# Patient Record
Sex: FEMALE | Race: WHITE | NOT HISPANIC OR LATINO | Employment: UNEMPLOYED | URBAN - METROPOLITAN AREA
[De-identification: names, ages, dates, MRNs, and addresses within clinical notes are randomized per-mention and may not be internally consistent; named-entity substitution may affect disease eponyms.]

---

## 2017-05-23 ENCOUNTER — ALLSCRIPTS OFFICE VISIT (OUTPATIENT)
Dept: OTHER | Facility: OTHER | Age: 27
End: 2017-05-23

## 2017-10-23 ENCOUNTER — ALLSCRIPTS OFFICE VISIT (OUTPATIENT)
Dept: OTHER | Facility: OTHER | Age: 27
End: 2017-10-23

## 2017-10-23 ENCOUNTER — GENERIC CONVERSION - ENCOUNTER (OUTPATIENT)
Dept: OTHER | Facility: OTHER | Age: 27
End: 2017-10-23

## 2017-10-24 LAB
ABO GROUP BLD: NORMAL
BLD GP AB SCN SERPL QL: NEGATIVE
HCG, QUANTITATIVE (HISTORICAL): NORMAL MIU/ML
PROGESTERONE LEVEL (HISTORICAL): 13.5 NG/ML
RH BLD: POSITIVE

## 2017-10-25 ENCOUNTER — ALLSCRIPTS OFFICE VISIT (OUTPATIENT)
Dept: OTHER | Facility: OTHER | Age: 27
End: 2017-10-25

## 2017-10-26 DIAGNOSIS — O36.80X0 PREGNANCY WITH INCONCLUSIVE FETAL VIABILITY: ICD-10-CM

## 2017-10-26 LAB
HCG, QUANTITATIVE (HISTORICAL): NORMAL MIU/ML
PROGESTERONE LEVEL (HISTORICAL): 13.2 NG/ML

## 2017-10-27 ENCOUNTER — GENERIC CONVERSION - ENCOUNTER (OUTPATIENT)
Dept: OTHER | Facility: OTHER | Age: 27
End: 2017-10-27

## 2017-10-31 ENCOUNTER — HOSPITAL ENCOUNTER (OUTPATIENT)
Dept: ULTRASOUND IMAGING | Facility: MEDICAL CENTER | Age: 27
Discharge: HOME | End: 2017-10-31
Payer: COMMERCIAL

## 2017-10-31 ENCOUNTER — GENERIC CONVERSION - ENCOUNTER (OUTPATIENT)
Dept: OTHER | Facility: OTHER | Age: 27
End: 2017-10-31

## 2017-10-31 DIAGNOSIS — O36.80X0 PREGNANCY WITH INCONCLUSIVE FETAL VIABILITY: ICD-10-CM

## 2017-10-31 PROCEDURE — 76801 OB US < 14 WKS SINGLE FETUS: CPT

## 2017-11-06 ENCOUNTER — ALLSCRIPTS OFFICE VISIT (OUTPATIENT)
Dept: OTHER | Facility: OTHER | Age: 27
End: 2017-11-06

## 2017-11-07 LAB
BACTERIA UR QL AUTO: ABNORMAL
BILIRUB UR QL STRIP: NEGATIVE
COLOR UR: YELLOW
COMMENT (HISTORICAL): CLEAR
CRYSTAL TYPE (HISTORICAL): ABNORMAL
CRYSTALS URNS QL MICRO: PRESENT
FECAL OCCULT BLOOD DIAGNOSTIC (HISTORICAL): NEGATIVE
GLUCOSE (HISTORICAL): NEGATIVE
KETONES UR STRIP-MCNC: NEGATIVE MG/DL
LEUKOCYTE ESTERASE UR QL STRIP: ABNORMAL
MICROSCOPIC EXAMINATION (HISTORICAL): ABNORMAL
NITRITE UR QL STRIP: NEGATIVE
NON-SQ EPI CELLS URNS QL MICRO: ABNORMAL /HPF
PH UR STRIP.AUTO: 6 [PH] (ref 5–7.5)
PROT UR STRIP-MCNC: NEGATIVE MG/DL
RBC (HISTORICAL): ABNORMAL /HPF
SP GR UR STRIP.AUTO: 1 (ref 1–1.03)
UROBILINOGEN UR QL STRIP.AUTO: 0.2 EU/DL (ref 0.2–1)
WBC # BLD AUTO: ABNORMAL /HPF

## 2017-11-08 LAB
ABO GROUP BLD: ABNORMAL
BASOPHILS # BLD AUTO: 0 %
BASOPHILS # BLD AUTO: 0 X10E3/UL (ref 0–0.2)
BLD GP AB SCN SERPL QL: NEGATIVE
DEPRECATED RDW RBC AUTO: 12.6 % (ref 12.3–15.4)
EOSINOPHIL # BLD AUTO: 0.1 X10E3/UL (ref 0–0.4)
EOSINOPHIL # BLD AUTO: 1 %
HCT VFR BLD AUTO: 39.5 % (ref 34–46.6)
HEPATITIS B SURFACE ANTIGEN (HISTORICAL): NEGATIVE
HGB BLD-MCNC: 13.2 G/DL (ref 11.1–15.9)
HIV-1/2 AB-AG (HISTORICAL): NON REACTIVE
IMM.GRANULOCYTES (CD4/8) (HISTORICAL): 0 %
IMM.GRANULOCYTES (CD4/8) (HISTORICAL): 0 X10E3/UL (ref 0–0.1)
LYMPHOCYTES # BLD AUTO: 2.9 X10E3/UL (ref 0.7–3.1)
LYMPHOCYTES # BLD AUTO: 33 %
MCH RBC QN AUTO: 30.6 PG (ref 26.6–33)
MCHC RBC AUTO-ENTMCNC: 33.4 G/DL (ref 31.5–35.7)
MCV RBC AUTO: 91 FL (ref 79–97)
MONOCYTES # BLD AUTO: 0.9 X10E3/UL (ref 0.1–0.9)
MONOCYTES (HISTORICAL): 10 %
NEUTROPHILS # BLD AUTO: 5 X10E3/UL (ref 1.4–7)
NEUTROPHILS # BLD AUTO: 56 %
PLATELET # BLD AUTO: 105 X10E3/UL (ref 150–379)
RBC (HISTORICAL): 4.32 X10E6/UL (ref 3.77–5.28)
RH BLD: POSITIVE
RPR SCREEN (HISTORICAL): NON REACTIVE
RUBELLA, IGG (HISTORICAL): <0.9 INDEX
WBC # BLD AUTO: 8.8 X10E3/UL (ref 3.4–10.8)

## 2017-11-09 LAB
CULTURE RESULT (HISTORICAL): NORMAL
MISCELLANEOUS LAB TEST RESULT (HISTORICAL): NORMAL

## 2017-11-13 ENCOUNTER — GENERIC CONVERSION - ENCOUNTER (OUTPATIENT)
Dept: OTHER | Facility: OTHER | Age: 27
End: 2017-11-13

## 2017-11-24 ENCOUNTER — GENERIC CONVERSION - ENCOUNTER (OUTPATIENT)
Dept: PERINATAL CARE | Facility: MEDICAL CENTER | Age: 27
End: 2017-11-24

## 2017-12-05 ENCOUNTER — GENERIC CONVERSION - ENCOUNTER (OUTPATIENT)
Dept: OTHER | Facility: OTHER | Age: 27
End: 2017-12-05

## 2017-12-05 ENCOUNTER — APPOINTMENT (OUTPATIENT)
Dept: PERINATAL CARE | Facility: CLINIC | Age: 27
End: 2017-12-05
Payer: COMMERCIAL

## 2017-12-05 PROCEDURE — 76813 OB US NUCHAL MEAS 1 GEST: CPT | Performed by: OBSTETRICS & GYNECOLOGY

## 2017-12-14 ENCOUNTER — GENERIC CONVERSION - ENCOUNTER (OUTPATIENT)
Dept: OTHER | Facility: OTHER | Age: 27
End: 2017-12-14

## 2017-12-16 LAB
CHLAMYDIA TRACHOMATIS BY MOL. METHOD (HISTORICAL): NEGATIVE
N GONORRHOEAE, AMPLIFIED DNA (HISTORICAL): NEGATIVE

## 2018-01-11 ENCOUNTER — GENERIC CONVERSION - ENCOUNTER (OUTPATIENT)
Dept: OTHER | Facility: OTHER | Age: 28
End: 2018-01-11

## 2018-01-11 NOTE — RESULT NOTES
Verified Results  US OB < 14 WEEKS SINGLE OR FIRST DESTINATION LEVEL 1 31Oct2017 03:40PM Audra Iqbal Order Number: [id number]    - Patient Instructions: To schedule this appointment, please contact Central Scheduling at [phone]  Test Name Result Flag Reference   US OB < 14 WEEKS SINGLE OR FIRST GESTATION (Report)     FIRST TRIMESTER OBSTETRIC ULTRASOUND, COMPLETE     INDICATION: Pregnancy with inconclusive fetal viability  LMP produces estimated gestational age of 11 weeks, 0 days  COMPARISON: None  TECHNIQUE:  Transabdominal ultrasound of the pelvis was performed  Additional transvaginal imaging was then performed to better assess the gestation, myometrial/endometrial architecture and ovarian parenchymal detail  The study includes volumetric    sweeps and traditional still imaging technique  FINDINGS:     A single live intrauterine gestation is identified  Cardiac activity is present  Heart rate of 164 bpm      YOLK SAC: Present and normal in size and appearance  MEAN GESTATIONAL SAC SIZE: 26 mm = 7 weeks 2 days    MEAN CROWN RUMP LENGTH: 17 mm = 8 weeks 0 days    FETAL ANATOMY: Grossly appropriate for gestational age but not well evaluated due to small size  AMNIOTIC FLUID/SAC SHAPE: Within expected normal range  Normal cortical amniotic separation  PLACENTA: Not well demonstrated  Mildly hypoechoic appearance of the endometrium adjacent to the gestational sac is neither crescentic, nor does it have mass effect on the gestational sac  This is not felt to represent perigestational hemorrhage  UTERUS/ADNEXA:    The uterus and ovaries are within normal limits  The cervix remains closed  No free fluid present  IMPRESSION:     Single live intrauterine gestation at 8 weeks 0 days (range +/- 5 days)  NIRAV of 6/12/2018 is identical to clinically estimated date         Workstation performed: [id number]     Signed by:   Mark Miguel MD   10/31/17

## 2018-01-12 NOTE — RESULT NOTES
Verified Results  (1) HCG QUANT [id number] 12:00AM [employer] Sabal     Test Name Result Flag Reference   hCG,Beta Subunit,Qnt,Serum 66780 mIU/mL     Female (Non-pregnant)    0 -     5                                             (Postmenopausal)  0 -     8                                      Female (Pregnant)                                      Weeks of Gestation                                              3                6 -    71                                              4               10 -   305                                              3              427 -  3363                                              6              [address] - [facility]                                              7             [address]167650                                              3            [address] [facility]                                              0            [phone]                                             16            [address] [place] [address]                                             [phone]                                             38             2860 - [address]47                                             27             6397 - 34468  Specimen was diluted in  order to obtain results  Results were repeated    Roche ECLIA methodology     (1) PROGESTERONE 23Oct2017 12:00AM [employer] Sabal     Test Name Result Flag Reference   Progesterone 75 8 ng/mL     Follicular phase       0 1 -   0 9                                      Luteal phase           1 8 -  23 9                                      Ovulation phase        0 1 -  12 0                                      Pregnant                                         First trimester    11 0 -  44 3 Second trimester   25 4 -  83 3                                         Third trimester    58 7 - 214 0                                      Postmenopausal         0 0 -   0 1     (LC) Rh+ABO+Ab Scr 23Oct2017 12:00AM Carr Benja     Test Name Result Flag Reference   ABO Grouping O     Rh Factor Positive     Please note: Prior records for this patient's ABO / Rh type are not  available for additional verification     Antibody Screen Negative  Negative

## 2018-01-12 NOTE — PROGRESS NOTES
Chief Complaint  Pt presents for a HCG/ Prog/ and T&S blood draw  LMP 9/9/2017      Active Problems    1  Encounter for gynecological examination with Papanicolaou smear of cervix (V72 31)   (Z01 419)   2  Encounter for gynecological examination without abnormal finding (V72 31) (Z01 419)   3  Encounter for preconception consultation (V26 49) (Z31 69)    Current Meds   1  Multi Prenatal TABS; Therapy: (Recorded:14Oct2016) to Recorded    Allergies    1  No Known Drug Allergies    Plan  Encounter to determine fetal viability of pregnancy    · (1) HCG QUANT; Status:Active; Requested IWD:[id number];    · (1) PROGESTERONE; Status:Active; Requested QKQ:[id number];    · (1) TYPE & SCREEN; Status:Active; Requested AAB:[id number];   currently receiving a biologic? : No  the patient pregnant or have they been in the last 90 days? : Yes  the patient been transfused in the last 3 months? : No  or PreOp : Medical    Future Appointments    Date/Time Provider Specialty Site   10/25/2017 08:00 AM Herb Nurse Schedule  OB [address] [place] [address]     Signatures   Electronically signed by :  CELENA Lucas ; Oct 23 2017 12:12PM EST                       (Author)

## 2018-01-14 VITALS
DIASTOLIC BLOOD PRESSURE: 78 MMHG | SYSTOLIC BLOOD PRESSURE: 126 MMHG | WEIGHT: 142.06 LBS | HEIGHT: 64 IN | BODY MASS INDEX: 24.25 KG/M2

## 2018-01-15 NOTE — PROGRESS NOTES
Chief Complaint  Pt presents today for a repeat hcg  Active Problems    1  Encounter for gynecological examination with Papanicolaou smear of cervix (V72 31)   (Z01 419)   2  Encounter for gynecological examination without abnormal finding (V72 31) (Z01 419)   3  Encounter for preconception consultation (V26 49) (Z31 69)   4  Encounter to determine fetal viability of pregnancy (V23 87) (O36 80X0)   5  Positive urine pregnancy test (V72 42) (Z32 01)    Current Meds   1  Multi Prenatal TABS; Therapy: (Recorded:14Oct2016) to Recorded    Allergies    1   No Known Drug Allergies    Results/Data  (1) HCG QUANT 25Oct2017 12:00AM Chely Sutton     Test Name Result Flag Reference   hCG,Beta Subunit,Qnt,Serum 29024 mIU/mL     Female (Non-pregnant)    0 -     5                                             (Postmenopausal)  0 -     8                                      Female (Pregnant)                                      Weeks of Gestation                                              3                6 -    71                                              4               10 -   750                                              5              262 - 8624                                              6              [address] [address]             [phone]                                              8            [address] [place] [address]                                              9            [phone]                                             [address] [place] [address]                                             [address] [id number] [phone]  Specimen was diluted in  order to obtain results  Results were repeated  Roche ECLIA methodology     (1) PROGESTERONE 25Oct2017 12:00AM Mary Sutton     Test Name Result Flag Reference   Progesterone 35 6 ng/mL     Follicular phase       0 1 -   0 9                                      Luteal phase           1 8 -  23 9                                      Ovulation phase        0 1 -  12 0                                      Pregnant                                         First trimester    11 0 -  44 3                                         Second trimester   25 4 -  83 3                                         Third trimester    58 7 - 214 0                                      Postmenopausal         0 0 -   0 1       Plan  Encounter to determine fetal viability of pregnancy    · (1) PROGESTERONE; Status:Complete;   Done: 63DZK3931 12:00AM  Positive urine pregnancy test    · (1) HCG QUANT; Status:Complete;   Done: 68MLW0024 12:00AM    Signatures   Electronically signed by :  CELENA Snyder ; Oct 27 2017 10:50AM EST                       (Author)

## 2018-01-17 NOTE — RESULT NOTES
Verified Results  (1) HCG QUANT [id number] 12:00AM Chely Sutton     Test Name Result Flag Reference   hCG,Beta Subunit,Qnt,Serum 56464 mIU/mL     Female (Non-pregnant)    0 -     5                                             (Postmenopausal)  0 -     8                                      Female (Pregnant)                                      Weeks of Gestation                                              3                6 -    71                                              4               10 -   750                                              5              130 - 2620                                              6              [address]                                              8            [address] [facility]                                              8            [address] [id number]                                             01            [facility] [facility] [address] -861248                                             70            [address] [facility]                                             9529 6837 [phone]                                             88             [phone] - [phone]  Specimen was diluted in  order to obtain results  Results were repeated    Roche ECLIA methodology       Plan  Encounter to determine fetal viability of pregnancy    · (1) PROGESTERONE; Status:Resulted - Requires Verification;   Done: 16UQR1257  12:00AM  Positive urine pregnancy test    · (1) HCG QUANT; Status:Complete;   Done: 52MJX2364 12:00AM

## 2018-01-23 NOTE — PROGRESS NOTES
DEC 5 2017         RE: Zach Emanuel                                 To: [facility]   MR#: [id number]                                  [address] [facility] [address]   : [address]: [phone]                             Fax: [phone]   (Exam #: [id number])      The LMP of this 32year old,  G1, P0-0-0-0 patient was SEP 5 2017, giving   her an NIRAV of 2018 and a current gestational age of 17 weeks 0 days   by dates  A sonographic examination was performed on DEC 5 2017 using real   time equipment  The ultrasound examination was performed using abdominal   technique  The patient has a BMI of 25 5  Her blood pressure today was   115/77  Earliest US on record:  10/31/2017  8w0d   2018 NIRAV      Thank you very much for referring this very nice patient for genetic   screening  This is her 1st pregnancy  She has no significant medical   history  Her surgical history is significant for wisdom teeth extraction  She denies the current use of tobacco, alcohol, or drugs  She currently   takes prenatal vitamins and has no significant drug allergies  Her family   medical history is unremarkable  A review of systems is otherwise   negative  On exam, the patient appears well, in no acute distress, and her   abdomen is nontender  Multiple longitudinal and transverse sections revealed a corral   intrauterine pregnancy with the fetus in transverse presentation  The   placenta is anterior in implantation        Cardiac motion was observed at 157 bpm       INDICATIONS      first trimester genetic screening      Exam Types      Level I   sequential screen      RESULTS      Fetus # 1 of 1   Transverse presentation   Fetal growth appeared normal      MEASUREMENTS (* Included In Average GA)      CRL              6 2 cm        12 weeks 3 days*   Nuchal Trans    1 70 mm      THE AVERAGE GESTATIONAL AGE is 12 weeks 3 days +/- 7 days  ANATOMY COMMENTS      Anatomic detail is limited at this gestational age  The fetal cranium   appeared normal in shape and the nuchal translucency was normal in size   (1 7mm)  The nasal bone appears to be present  The intracranial anatomy   was unremarkable  The cardiac rhythm was regular and documented with   M-mode  Within the abdomen, the stomach & bladder were visualized and the   abdominal wall appeared intact  A three vessel cord appears to be present  Active movement of the fetal body & extremities was seen  There is no   suspicion of a subchorionic bleed  The placental cord insertion appeared   normal    Evaluation of the spine and thorax were limited due to fetal   position  There is no suspicion of a uterine myoma  Free fluid is not seen   in the posterior cul-de-sac  ADNEXA      The left ovary appeared normal and measured 2 1 x 2 3 x 1 4 cm with a   volume of 3 5 cc  The right ovary appeared normal and measured 1 8 x 1 4 x   2 1 cm with a volume of 2 8 cc  AMNIOTIC FLUID         Largest Vertical Pocket = 4 0 cm   Amniotic Fluid: Normal      IMPRESSION      Reyna IUP   12 weeks and 3 days by this ultrasound  (NIRAV=JUN 16 2018)   Transverse presentation   Fetal growth appeared normal   Regular fetal heart rate of 157 bpm   Anterior placenta      GENERAL COMMENT      We discussed the options for genetic screening, including but not limited   to first trimester screening, second trimester screening, combined first   and second trimester screening, noninvasive prenatal testing (NIPT) for   patients at high risk and diagnostic screening through the use of CVS and   amniocentesis    We discussed the risks and benefits of each approach   including the sensitivities and false positive rates as well as the   difference between a screening test and a diagnostic test   At the   conclusion of our discussion the patient elected Sequential Screening to delineate her risk for fetal aneuploidy  A maternal blood sample was   obtained on the day of the ultrasound  The first trimester portion of the   screening results, encompassing age, nuchal translucency, and biochemistry   should be available within one week of testing and will be reported from   [facility]   The second stage of sequential screening should be completed   between the 15th and 21st week of pregnancy (ideally between 16-18 weeks)  We will call the patient with any and all results and scan the results   under Genetic counseling  We discussed follow-up in detail and I recommend an anatomy ultrasound be   scheduled for 20 weeks gestation  Thank you very much for allowing us to participate in the care of this   very nice patient  Should you have any questions, please do not hesitate   to contact our office  Please note, in addition to the time spent discussing the results of the   ultrasound, I spent approximately 10 minutes of face-to-face time with the   patient, greater than 50% of which was spent in counseling and the   coordination of care for this patient  Portions of the record may have been created with voice recognition   software  Occasional wrong word or "sound a like" substitutions may have   occurred due to the inherent limitations of voice recognition software  Read the chart carefully and recognize, using context, where substitutions   have occurred  Lubertha Costa, R D M S Florencia Nyhan, M D     Electronically signed 12/05/17 10:42

## 2018-01-24 VITALS
HEIGHT: 64 IN | WEIGHT: 144 LBS | DIASTOLIC BLOOD PRESSURE: 77 MMHG | BODY MASS INDEX: 24.59 KG/M2 | SYSTOLIC BLOOD PRESSURE: 115 MMHG

## 2018-01-24 VITALS
WEIGHT: 145.38 LBS | HEIGHT: 64 IN | SYSTOLIC BLOOD PRESSURE: 110 MMHG | BODY MASS INDEX: 24.82 KG/M2 | DIASTOLIC BLOOD PRESSURE: 74 MMHG

## 2018-01-24 VITALS — BODY MASS INDEX: 26.33 KG/M2 | DIASTOLIC BLOOD PRESSURE: 82 MMHG | WEIGHT: 151 LBS | SYSTOLIC BLOOD PRESSURE: 122 MMHG

## 2018-01-31 ENCOUNTER — ROUTINE PRENATAL (OUTPATIENT)
Dept: PERINATAL CARE | Facility: CLINIC | Age: 28
End: 2018-01-31
Payer: COMMERCIAL

## 2018-01-31 VITALS
WEIGHT: 153.2 LBS | SYSTOLIC BLOOD PRESSURE: 130 MMHG | BODY MASS INDEX: 27.14 KG/M2 | HEART RATE: 76 BPM | DIASTOLIC BLOOD PRESSURE: 79 MMHG | HEIGHT: 63 IN

## 2018-01-31 DIAGNOSIS — Z36.86 ENCOUNTER FOR ANTENATAL SCREENING FOR CERVICAL LENGTH: Primary | ICD-10-CM

## 2018-01-31 DIAGNOSIS — Z36.3 ENCOUNTER FOR ANTENATAL SCREENING FOR MALFORMATIONS: ICD-10-CM

## 2018-01-31 PROCEDURE — 76817 TRANSVAGINAL US OBSTETRIC: CPT | Performed by: OBSTETRICS & GYNECOLOGY

## 2018-01-31 PROCEDURE — 76805 OB US >/= 14 WKS SNGL FETUS: CPT | Performed by: OBSTETRICS & GYNECOLOGY

## 2018-01-31 PROCEDURE — 99212 OFFICE O/P EST SF 10 MIN: CPT | Performed by: OBSTETRICS & GYNECOLOGY

## 2018-01-31 NOTE — PROGRESS NOTES
Ellyn Connors presents to the [facility] today for level 2 ultrasound evaluation  Please refer to the ultrasound report for additional information

## 2018-02-06 ENCOUNTER — OB ABSTRACT (OUTPATIENT)
Dept: OBGYN CLINIC | Facility: MEDICAL CENTER | Age: 28
End: 2018-02-06

## 2018-02-07 ENCOUNTER — ROUTINE PRENATAL (OUTPATIENT)
Dept: OBGYN CLINIC | Facility: MEDICAL CENTER | Age: 28
End: 2018-02-07

## 2018-02-07 VITALS — SYSTOLIC BLOOD PRESSURE: 126 MMHG | BODY MASS INDEX: 27.74 KG/M2 | WEIGHT: 156.6 LBS | DIASTOLIC BLOOD PRESSURE: 74 MMHG

## 2018-02-07 DIAGNOSIS — Z34.92 SECOND TRIMESTER PREGNANCY: Primary | ICD-10-CM

## 2018-02-07 PROCEDURE — PNV: Performed by: OBSTETRICS & GYNECOLOGY

## 2018-02-07 NOTE — PROGRESS NOTES
Min Alfaro is a 25y o  year old  at 22w1d for routine prenatal visit    + FM, no vaginal bleeding, contractions, or LOF  Complaints: NO  Most recent ultrasound and labs reviewed  - normal anatomy , having a girl  Already had flu shot  Follow up in 4 weeks   Aware will need 1hr gtt at 28 weeks

## 2018-03-07 NOTE — PROGRESS NOTES
Education  Baby & Me Education 1st Trimester:   First Trimester Education provided:   benefits of breastfeeding, importance of exclusive breastfeeding, early initiation of breastfeeding, exclusive breastfeeding for the first 6 months and Pregnancy Essentials Reference Guide given   The patient is planning on breastfeeding  Prenatal education provided by: Kathe man      Signatures   Electronically signed by :  PATRICE Mina; Nov 6 2017  5:31PM EST                       (Author)

## 2018-03-08 ENCOUNTER — ROUTINE PRENATAL (OUTPATIENT)
Dept: OBGYN CLINIC | Facility: MEDICAL CENTER | Age: 28
End: 2018-03-08

## 2018-03-08 VITALS — WEIGHT: 162 LBS | BODY MASS INDEX: 28.7 KG/M2 | DIASTOLIC BLOOD PRESSURE: 70 MMHG | SYSTOLIC BLOOD PRESSURE: 110 MMHG

## 2018-03-08 DIAGNOSIS — Z34.02 ENCOUNTER FOR SUPERVISION OF NORMAL FIRST PREGNANCY IN SECOND TRIMESTER: Primary | ICD-10-CM

## 2018-03-08 PROBLEM — Z28.39 RUBELLA NON-IMMUNE STATUS, ANTEPARTUM: Status: ACTIVE | Noted: 2018-03-08

## 2018-03-08 PROBLEM — O09.899 RUBELLA NON-IMMUNE STATUS, ANTEPARTUM: Status: ACTIVE | Noted: 2018-03-08

## 2018-03-08 PROCEDURE — PNV: Performed by: OBSTETRICS & GYNECOLOGY

## 2018-03-08 NOTE — PROGRESS NOTES
Rula Damon is a 25y o  year old  at 34w1d for routine prenatal visit    + FM, no vaginal bleeding, contractions, or LOF  Complaints: No   Most recent ultrasound and labs reviewed    28 week labs and tdap at next visit  Discussed weight gain (at 20lbs for pregnancy) and FKC  Thrombocytopenia on initial blood work - follow up next result

## 2018-03-21 ENCOUNTER — ROUTINE PRENATAL (OUTPATIENT)
Dept: OBGYN CLINIC | Facility: MEDICAL CENTER | Age: 28
End: 2018-03-21
Payer: COMMERCIAL

## 2018-03-21 VITALS — SYSTOLIC BLOOD PRESSURE: 118 MMHG | DIASTOLIC BLOOD PRESSURE: 76 MMHG | BODY MASS INDEX: 28.91 KG/M2 | WEIGHT: 163.2 LBS

## 2018-03-21 DIAGNOSIS — Z34.93 THIRD TRIMESTER PREGNANCY: Primary | ICD-10-CM

## 2018-03-21 LAB
BASOPHILS # BLD AUTO: 0.01 THOUSANDS/ΜL (ref 0–0.1)
BASOPHILS NFR BLD AUTO: 0 % (ref 0–1)
EOSINOPHIL # BLD AUTO: 0.06 THOUSAND/ΜL (ref 0–0.61)
EOSINOPHIL NFR BLD AUTO: 1 % (ref 0–6)
ERYTHROCYTE [DISTWIDTH] IN BLOOD BY AUTOMATED COUNT: 12.6 % (ref 11.6–15.1)
GLUCOSE 1H P 50 G GLC PO SERPL-MCNC: 93 MG/DL
HCT VFR BLD AUTO: 36.7 % (ref 34.8–46.1)
HGB BLD-MCNC: 12.6 G/DL (ref 11.5–15.4)
LYMPHOCYTES # BLD AUTO: 2.19 THOUSANDS/ΜL (ref 0.6–4.47)
LYMPHOCYTES NFR BLD AUTO: 18 % (ref 14–44)
MCH RBC QN AUTO: 31.6 PG (ref 26.8–34.3)
MCHC RBC AUTO-ENTMCNC: 34.3 G/DL (ref 31.4–37.4)
MCV RBC AUTO: 92 FL (ref 82–98)
MONOCYTES # BLD AUTO: 1.37 THOUSAND/ΜL (ref 0.17–1.22)
MONOCYTES NFR BLD AUTO: 12 % (ref 4–12)
NEUTROPHILS # BLD AUTO: 8.15 THOUSANDS/ΜL (ref 1.85–7.62)
NEUTS SEG NFR BLD AUTO: 69 % (ref 43–75)
NRBC BLD AUTO-RTO: 0 /100 WBCS
PLATELET # BLD AUTO: 102 THOUSANDS/UL (ref 149–390)
PMV BLD AUTO: 9.9 FL (ref 8.9–12.7)
RBC # BLD AUTO: 3.99 MILLION/UL (ref 3.81–5.12)
WBC # BLD AUTO: 11.87 THOUSAND/UL (ref 4.31–10.16)

## 2018-03-21 PROCEDURE — 82950 GLUCOSE TEST: CPT | Performed by: OBSTETRICS & GYNECOLOGY

## 2018-03-21 PROCEDURE — PNV: Performed by: OBSTETRICS & GYNECOLOGY

## 2018-03-21 PROCEDURE — 85025 COMPLETE CBC W/AUTO DIFF WBC: CPT | Performed by: OBSTETRICS & GYNECOLOGY

## 2018-03-21 PROCEDURE — 36415 COLL VENOUS BLD VENIPUNCTURE: CPT | Performed by: OBSTETRICS & GYNECOLOGY

## 2018-03-21 NOTE — PROGRESS NOTES
Laney Way is a 25y o  year old  at 29w1d for routine prenatal visit  GTT and CBC done today- today we discussed need for follow up from PLT count / aware if still low may need to be sent to hematologist   RhoGam needed No  Tdap needed Yes - was not given today as vaccine not available   [address] [facility]  28 week booklet and birth plan given today

## 2018-03-23 NOTE — PROGRESS NOTES
Please inform normal 1 hr glucose , platelet count still decreased at 102 / please have patient be evlauated by Hematology thanks

## 2018-03-26 DIAGNOSIS — D69.6 DECREASED PLATELET COUNT (HCC): Primary | ICD-10-CM

## 2018-04-02 ENCOUNTER — ROUTINE PRENATAL (OUTPATIENT)
Dept: OBGYN CLINIC | Facility: MEDICAL CENTER | Age: 28
End: 2018-04-02

## 2018-04-02 VITALS — DIASTOLIC BLOOD PRESSURE: 80 MMHG | SYSTOLIC BLOOD PRESSURE: 120 MMHG | WEIGHT: 167.3 LBS | BODY MASS INDEX: 29.64 KG/M2

## 2018-04-02 DIAGNOSIS — Z3A.29 29 WEEKS GESTATION OF PREGNANCY: Primary | ICD-10-CM

## 2018-04-02 PROCEDURE — PNV: Performed by: NURSE PRACTITIONER

## 2018-04-02 NOTE — PROGRESS NOTES
Adelaide Yousif is a 25y o  year old  at 33w8d for routine prenatal visit    + FM, no vaginal bleeding, contractions, or LOF  Complaints: No   Most recent ultrasound and labs reviewed  Has appt with hematology  for low plts  Fkc, ptl precautions reviewed  Will return birth plan at next visit

## 2018-04-13 ENCOUNTER — OFFICE VISIT (OUTPATIENT)
Dept: HEMATOLOGY ONCOLOGY | Facility: CLINIC | Age: 28
End: 2018-04-13
Payer: COMMERCIAL

## 2018-04-13 VITALS
RESPIRATION RATE: 14 BRPM | WEIGHT: 166 LBS | HEIGHT: 64 IN | BODY MASS INDEX: 28.34 KG/M2 | TEMPERATURE: 97.6 F | HEART RATE: 80 BPM | DIASTOLIC BLOOD PRESSURE: 60 MMHG | OXYGEN SATURATION: 98 % | SYSTOLIC BLOOD PRESSURE: 102 MMHG

## 2018-04-13 DIAGNOSIS — D69.6 DECREASED PLATELET COUNT (HCC): ICD-10-CM

## 2018-04-13 PROCEDURE — 99244 OFF/OP CNSLTJ NEW/EST MOD 40: CPT | Performed by: INTERNAL MEDICINE

## 2018-04-13 NOTE — LETTER
April 13, 2018     Jeff Islas MD  [address]     Patient: Sofi Kessler   YOB: 1990   Date of Visit: 4/13/2018       Dear Dr Althea Means: Thank you for referring Sofi Kessler to me for evaluation  Below are my notes for this consultation  If you have questions, please do not hesitate to call me  I look forward to following your patient along with you  Sincerely,        Kisha Dong MD        CC: No Recipients  Kisha Dong MD  4/13/2018  8:39 AM  Sign at close encounter  Hematology / Oncology Outpatient Consult Note    Sofi Kessler 25 y o  female [birth year] [id number]         Date:  4/13/2018    Assessment / Plan:  A 68-year-old premenopausal woman who is currently 31 weeks of pregnancy for her 1st baby  Since she became pregnant, she has minimal thrombocytopenia with no symptomatology  She has no history or symptomatology of bleeding disorder  She has no family history of bleeding disorder, either  Her CBC is otherwise completely normal  Clinically, this is likely to be gestational thrombocytopenia which usually have benign course  Therefore, hematologic treatment intervention is not indicated  I recommended her to have CBC monitored by [facility] physician  If she has above 75,000 of platelet count before the labor, epidural anesthesia can be done very safely  Some of the literature suggest epidural can be done, if platelet count above 50,000  I think it is quite unlikely that her platelet count dropped below 75, since this is likely to be distention or thrombocytopenia  I told her that there is very small chance that she has immune thrombocytopenia in which case my recommendation would be the same  I also suggested to have CBC checked several months after she delivered baby to make sure that her platelet count returned to normal  She is in agreement with my recommendation  She may see me p r n  basis           Subjective:     HPI:  A 42-year-old premenopausal woman who has no past medical history  She is currently 31 weeks of pregnancy for her 1st baby  In November 2017, when she was 8 weeks of pregnancy, CBC showed minimal thrombocytopenia with platelet count 223  Otherwise, her CBC was completely normal  Her pregnancy has been uncomplicated  Recent CBC showed platelet count 324  Therefore, she was referred to me for further evaluation  She has no symptomatology from hematology standpoint  She particularly denied any bleeding symptoms such as epistaxis, gum bleeding  She always has some easy bruising in the lower extremity  She has hemostatic challenge with within tooth extraction when she was 23years old with no postoperative bleeding  She has no other major surgery  She has no family history of bleeding disorder  She has no family history of malignancy  Currently, she is in usual state of health  She feels well  She is afebrile  She has no complaint of pain  She denied respiratory symptoms  She has no history of hepatitis  HIV antibody and hepatitis B surface antigen was negative  Her performance status is normal  She told me that when she was not pregnant, her platelet count was normal, previously  Interval History:          Objective:     Primary Diagnosis:    Minimal thrombocytopenia, most likely gestational thrombocytopenia  Cancer Staging:  No matching staging information was found for the patient  Previous Hematologic/ Oncologic Treatment:         Current Hematologic/ Oncologic Treatment:      Observation  Disease Status:     NA    Test Results:    Pathology:        Radiology:        Laboratory:    See below  In November 2017, platelet count was 567  She has normal WBC, differential and hemoglobin      Physical Exam:      General Appearance:    Alert, oriented        Eyes:    PERRL   Ears:    Normal external ear canals, both ears   Nose:   Nares normal, septum midline   Throat:   Mucosa moist  Pharynx without injection  Neck:   Supple       Lungs:     Clear to auscultation bilaterally   Chest Wall:    No tenderness or deformity    Heart:    Regular rate and rhythm       Abdomen:     Soft, non-tender, bowel sounds +, no organomegaly, as expected with her 30 weeks of pregnancy  Extremities:   Extremities no cyanosis or edema       Skin:   no rash or icterus  Lymph nodes:   Cervical, supraclavicular, and axillary nodes normal   Neurologic:   CNII-XII intact, normal strength, sensation and reflexes     Throughout          Breast exam:   NA         ROS: Review of Systems   All other systems reviewed and are negative  Imaging: No results found  Labs:   Lab Results   Component Value Date    WBC 11 87 (H) 03/21/2018    HGB 12 6 03/21/2018    HCT 36 7 03/21/2018    MCV 92 03/21/2018     (L) 03/21/2018     No results found for: NA, K, CL, CO2, ANIONGAP, BUN, CREATININE, GLUCOSE, GLUF, CALCIUM, CORRECTEDCA, AST, ALT, ALKPHOS, PROT, ALBUMIN, BILITOT, EGFR          Vital Sign:    Body surface area is 1 8 meters squared      Wt Readings from Last 3 Encounters:   04/13/18 75 3 kg (166 lb)   04/02/18 75 9 kg (167 lb 4 8 oz)   03/21/18 74 kg (163 lb 3 2 oz)        Temp Readings from Last 3 Encounters:   04/13/18 97 6 °F (36 4 °C) (Tympanic)        BP Readings from Last 3 Encounters:   04/13/18 102/60   04/02/18 120/80   03/21/18 118/76         Pulse Readings from Last 3 Encounters:   04/13/18 80   01/31/18 76     @LASTSAO2(3)@    Active Problems:   Patient Active Problem List   Diagnosis    Rubella non-immune status, antepartum    Decreased platelet count (HCC)       Past Medical History:   Past Medical History:   Diagnosis Date    Varicella        Surgical History:   Past Surgical History:   Procedure Laterality Date    WISDOM TOOTH EXTRACTION         Family History:    Family History   Problem Relation Age of Onset    Hypertension Mother    Amy Jain Hypertension Father        Cancer-related family history is not on file  Social History:   Social History     Social History    Marital status: /Civil Union     Spouse name: N/A    Number of children: N/A    Years of education: N/A     Occupational History    Not on file  Social History Main Topics    Smoking status: Never Smoker    Smokeless tobacco: Never Used    Alcohol use No    Drug use: No    Sexual activity: Yes     Partners: Male     Other Topics Concern    Not on file     Social History Narrative    No narrative on file       Current Medications:   Current Outpatient Prescriptions   Medication Sig Dispense Refill    Prenatal MV-Min-Fe Fum-FA-DHA (PRENATAL 1 PO) Take 1 tablet by mouth daily       No current facility-administered medications for this visit          Allergies: No Known Allergies

## 2018-04-13 NOTE — PROGRESS NOTES
Hematology / Oncology Outpatient Consult Note    Deana Gan 25 y o  female [id number]         Date:  4/13/2018    Assessment / Plan:  A 53-year-old premenopausal woman who is currently 31 weeks of pregnancy for her 1st baby  Since she became pregnant, she has minimal thrombocytopenia with no symptomatology  She has no history or symptomatology of bleeding disorder  She has no family history of bleeding disorder, either  Her CBC is otherwise completely normal  Clinically, this is likely to be gestational thrombocytopenia which usually have benign course  Therefore, hematologic treatment intervention is not indicated  I recommended her to have CBC monitored by [facility] [place] physician  If she has above 75,000 of platelet count before the labor, epidural anesthesia can be done very safely  Some of the literature suggest epidural can be done, if platelet count above 50,000  I think it is quite unlikely that her platelet count dropped below 75, since this is likely to be distention or thrombocytopenia  I told her that there is very small chance that she has immune thrombocytopenia in which case my recommendation would be the same  I also suggested to have CBC checked several months after she delivered baby to make sure that her platelet count returned to normal  She is in agreement with my recommendation  She may see me p r n  basis  Subjective:     HPI:  A 53-year-old premenopausal woman who has no past medical history  She is currently 31 weeks of pregnancy for her 1st baby  In November 2017, when she was 8 weeks of pregnancy, CBC showed minimal thrombocytopenia with platelet count 798  Otherwise, her CBC was completely normal  Her pregnancy has been uncomplicated  Recent CBC showed platelet count 847  Therefore, she was referred to me for further evaluation  She has no symptomatology from hematology standpoint    She particularly denied any bleeding symptoms such as epistaxis, gum bleeding  She always has some easy bruising in the lower extremity  She has hemostatic challenge with within tooth extraction when she was 23years old with no postoperative bleeding  She has no other major surgery  She has no family history of bleeding disorder  She has no family history of malignancy  Currently, she is in usual state of health  She feels well  She is afebrile  She has no complaint of pain  She denied respiratory symptoms  She has no history of hepatitis  HIV antibody and hepatitis B surface antigen was negative  Her performance status is normal  She told me that when she was not pregnant, her platelet count was normal, previously  Interval History:          Objective:     Primary Diagnosis:    Minimal thrombocytopenia, most likely gestational thrombocytopenia  Cancer Staging:  No matching staging information was found for the patient  Previous Hematologic/ Oncologic Treatment:         Current Hematologic/ Oncologic Treatment:      Observation  Disease Status:     NA    Test Results:    Pathology:        Radiology:        Laboratory:    See below  In November 2017, platelet count was 716  She has normal WBC, differential and hemoglobin  Physical Exam:      General Appearance:    Alert, oriented        Eyes:    PERRL   Ears:    Normal external ear canals, both ears   Nose:   Nares normal, septum midline   Throat:   Mucosa moist  Pharynx without injection  Neck:   Supple       Lungs:     Clear to auscultation bilaterally   Chest Wall:    No tenderness or deformity    Heart:    Regular rate and rhythm       Abdomen:     Soft, non-tender, bowel sounds +, no organomegaly, as expected with her 30 weeks of pregnancy  Extremities:   Extremities no cyanosis or edema       Skin:   no rash or icterus      Lymph nodes:   Cervical, supraclavicular, and axillary nodes normal   Neurologic:   CNII-XII intact, normal strength, sensation and reflexes     Throughout Breast exam:   NA         ROS: Review of Systems   All other systems reviewed and are negative  Imaging: No results found  Labs:   Lab Results   Component Value Date    WBC 11 87 (H) 03/21/2018    HGB 12 6 03/21/2018    HCT 36 7 03/21/2018    MCV 92 03/21/2018     (L) 03/21/2018     No results found for: NA, K, CL, CO2, ANIONGAP, BUN, CREATININE, GLUCOSE, GLUF, CALCIUM, CORRECTEDCA, AST, ALT, ALKPHOS, PROT, ALBUMIN, BILITOT, EGFR          Vital Sign:    Body surface area is 1 8 meters squared  Wt Readings from Last 3 Encounters:   04/13/18 75 3 kg (166 lb)   04/02/18 75 9 kg (167 lb 4 8 oz)   03/21/18 74 kg (163 lb 3 2 oz)        Temp Readings from Last 3 Encounters:   04/13/18 97 6 °F (36 4 °C) (Tympanic)        BP Readings from Last 3 Encounters:   04/13/18 102/60   04/02/18 120/80   03/21/18 118/76         Pulse Readings from Last 3 Encounters:   04/13/18 80   01/31/18 76     @LASTSAO2(3)@    Active Problems:   Patient Active Problem List   Diagnosis    Rubella non-immune status, antepartum    Decreased platelet count (HCC)       Past Medical History:   Past Medical History:   Diagnosis Date    Varicella        Surgical History:   Past Surgical History:   Procedure Laterality Date    WISDOM TOOTH EXTRACTION         Family History:    Family History   Problem Relation Age of Onset    Hypertension Mother     Hypertension Father        Cancer-related family history is not on file  Social History:   Social History     Social History    Marital status: /Civil Union     Spouse name: N/A    Number of children: N/A    Years of education: N/A     Occupational History    Not on file       Social History Main Topics    Smoking status: Never Smoker    Smokeless tobacco: Never Used    Alcohol use No    Drug use: No    Sexual activity: Yes     Partners: Male     Other Topics Concern    Not on file     Social History Narrative    No narrative on file       Current Medications:   Current Outpatient Prescriptions   Medication Sig Dispense Refill    Prenatal MV-Min-Fe Fum-FA-DHA (PRENATAL 1 PO) Take 1 tablet by mouth daily       No current facility-administered medications for this visit          Allergies: No Known Allergies

## 2018-04-17 ENCOUNTER — ROUTINE PRENATAL (OUTPATIENT)
Dept: OBGYN CLINIC | Facility: MEDICAL CENTER | Age: 28
End: 2018-04-17

## 2018-04-17 VITALS — SYSTOLIC BLOOD PRESSURE: 118 MMHG | WEIGHT: 170.1 LBS | DIASTOLIC BLOOD PRESSURE: 80 MMHG | BODY MASS INDEX: 29.66 KG/M2

## 2018-04-17 DIAGNOSIS — Z34.93 THIRD TRIMESTER PREGNANCY: Primary | ICD-10-CM

## 2018-04-17 PROCEDURE — PNV: Performed by: OBSTETRICS & GYNECOLOGY

## 2018-04-17 NOTE — PROGRESS NOTES
Aundrea Sullivan is a 25y o  year old  at 32w0d for routine prenatal visit    + FM, no vaginal bleeding, contractions, or LOF  Complaints: No   Most recent ultrasound and labs reviewed    TDAP needed next visit   Gestational thrombocytopenia - recommend CBC weekly after 36 weeks as she may need steroids to increase PLT   All questions answered  [address] and PTL precautions  Birth plan signed

## 2018-05-01 ENCOUNTER — ROUTINE PRENATAL (OUTPATIENT)
Dept: OBGYN CLINIC | Facility: MEDICAL CENTER | Age: 28
End: 2018-05-01
Payer: COMMERCIAL

## 2018-05-01 VITALS — BODY MASS INDEX: 29.99 KG/M2 | DIASTOLIC BLOOD PRESSURE: 60 MMHG | WEIGHT: 172 LBS | SYSTOLIC BLOOD PRESSURE: 98 MMHG

## 2018-05-01 DIAGNOSIS — Z23 NEED FOR TDAP VACCINATION: Primary | ICD-10-CM

## 2018-05-01 PROCEDURE — PNV: Performed by: NURSE PRACTITIONER

## 2018-05-01 PROCEDURE — 90715 TDAP VACCINE 7 YRS/> IM: CPT

## 2018-05-01 PROCEDURE — 90471 IMMUNIZATION ADMIN: CPT

## 2018-05-14 ENCOUNTER — ROUTINE PRENATAL (OUTPATIENT)
Dept: OBGYN CLINIC | Facility: MEDICAL CENTER | Age: 28
End: 2018-05-14
Payer: COMMERCIAL

## 2018-05-14 VITALS — BODY MASS INDEX: 30.39 KG/M2 | DIASTOLIC BLOOD PRESSURE: 72 MMHG | WEIGHT: 174.3 LBS | SYSTOLIC BLOOD PRESSURE: 120 MMHG

## 2018-05-14 DIAGNOSIS — D69.6 DECREASED PLATELET COUNT (HCC): ICD-10-CM

## 2018-05-14 DIAGNOSIS — Z3A.35 35 WEEKS GESTATION OF PREGNANCY: Primary | ICD-10-CM

## 2018-05-14 LAB
BASOPHILS # BLD AUTO: 0.01 THOUSANDS/ΜL (ref 0–0.1)
BASOPHILS NFR BLD AUTO: 0 % (ref 0–1)
EOSINOPHIL # BLD AUTO: 0.05 THOUSAND/ΜL (ref 0–0.61)
EOSINOPHIL NFR BLD AUTO: 0 % (ref 0–6)
ERYTHROCYTE [DISTWIDTH] IN BLOOD BY AUTOMATED COUNT: 13.1 % (ref 11.6–15.1)
HCT VFR BLD AUTO: 39.5 % (ref 34.8–46.1)
HGB BLD-MCNC: 12.9 G/DL (ref 11.5–15.4)
LYMPHOCYTES # BLD AUTO: 2.42 THOUSANDS/ΜL (ref 0.6–4.47)
LYMPHOCYTES NFR BLD AUTO: 19 % (ref 14–44)
MCH RBC QN AUTO: 30.2 PG (ref 26.8–34.3)
MCHC RBC AUTO-ENTMCNC: 32.7 G/DL (ref 31.4–37.4)
MCV RBC AUTO: 93 FL (ref 82–98)
MONOCYTES # BLD AUTO: 1.62 THOUSAND/ΜL (ref 0.17–1.22)
MONOCYTES NFR BLD AUTO: 13 % (ref 4–12)
NEUTROPHILS # BLD AUTO: 8.44 THOUSANDS/ΜL (ref 1.85–7.62)
NEUTS SEG NFR BLD AUTO: 68 % (ref 43–75)
NRBC BLD AUTO-RTO: 0 /100 WBCS
RBC # BLD AUTO: 4.27 MILLION/UL (ref 3.81–5.12)
WBC # BLD AUTO: 12.58 THOUSAND/UL (ref 4.31–10.16)

## 2018-05-14 PROCEDURE — PNV: Performed by: OBSTETRICS & GYNECOLOGY

## 2018-05-14 PROCEDURE — 36415 COLL VENOUS BLD VENIPUNCTURE: CPT | Performed by: OBSTETRICS & GYNECOLOGY

## 2018-05-14 PROCEDURE — 87653 STREP B DNA AMP PROBE: CPT | Performed by: OBSTETRICS & GYNECOLOGY

## 2018-05-14 PROCEDURE — 85025 COMPLETE CBC W/AUTO DIFF WBC: CPT | Performed by: OBSTETRICS & GYNECOLOGY

## 2018-05-14 NOTE — PROGRESS NOTES
Roni Hankins is a 25y o  year old  at 26w5d for routine prenatal visit  GBS done Yes  PCN allergy No  Labor precautions given  [address] reviewed     CBC collected today given thrombocytopenia   All questions answered

## 2018-05-16 NOTE — PROGRESS NOTES
This was the patient I called you about   She will need repeat platelet count on citrate buffer medium   She might have to go to lab to get this drawn

## 2018-05-18 LAB — GP B STREP DNA SPEC QL NAA+PROBE: ABNORMAL

## 2018-05-21 NOTE — PROGRESS NOTES
Please inform patient  GBS positive will therefore need abx in labor - we will discuss further at next visit   Also please inform Melanocytes still present on repeat CBC therefore I recommend hematology consult for further recommendations    More likely than not this is nothing to worry about

## 2018-05-22 ENCOUNTER — TRANSCRIBE ORDERS (OUTPATIENT)
Dept: ADMINISTRATIVE | Facility: HOSPITAL | Age: 28
End: 2018-05-22

## 2018-05-22 ENCOUNTER — ROUTINE PRENATAL (OUTPATIENT)
Dept: OBGYN CLINIC | Facility: MEDICAL CENTER | Age: 28
End: 2018-05-22

## 2018-05-22 VITALS — SYSTOLIC BLOOD PRESSURE: 126 MMHG | WEIGHT: 176 LBS | DIASTOLIC BLOOD PRESSURE: 60 MMHG | BODY MASS INDEX: 30.69 KG/M2

## 2018-05-22 DIAGNOSIS — D69.6 THROMBOCYTOPENIA (HCC): ICD-10-CM

## 2018-05-22 DIAGNOSIS — Z3A.37 37 WEEKS GESTATION OF PREGNANCY: ICD-10-CM

## 2018-05-22 DIAGNOSIS — Z34.03 ENCOUNTER FOR SUPERVISION OF NORMAL FIRST PREGNANCY IN THIRD TRIMESTER: Primary | ICD-10-CM

## 2018-05-22 PROCEDURE — PNV: Performed by: OBSTETRICS & GYNECOLOGY

## 2018-05-22 NOTE — PROGRESS NOTES
[facility] is a 25y o  year old  at 37w0d for routine prenatal visit    + FM, no vaginal bleeding, contractions, or LOF  Complaints: No   Most recent ultrasound and labs reviewed  Aware GBS positive  Check platelets with citrate buffer - script sent     If normal, d/c weekly cbc

## 2018-05-23 ENCOUNTER — TELEPHONE (OUTPATIENT)
Dept: HEMATOLOGY ONCOLOGY | Facility: CLINIC | Age: 28
End: 2018-05-23

## 2018-05-23 DIAGNOSIS — D69.6 THROMBOCYTOPENIA (HCC): Primary | ICD-10-CM

## 2018-05-23 RX ORDER — METHYLPREDNISOLONE 4 MG/1
TABLET ORAL
Qty: 21 TABLET | Refills: 0 | Status: ON HOLD | OUTPATIENT
Start: 2018-05-23 | End: 2018-05-30

## 2018-05-23 NOTE — TELEPHONE ENCOUNTER
Patient is 37 weeks pregnant and her [address] wants her to follow up with Dr Margot Seay because of her WBC patient was last seen last month as a new patient and Dr Sridhar Terrell told her she didn't need to come back? Obgyn never told her what her WBC was?  Please call pt

## 2018-05-23 NOTE — TELEPHONE ENCOUNTER
Spoke with Dr Aidan Jones and advised pt to come into office to discuss platelets and WBC's    Called and scheduled pt for 5/24 @ 3pm

## 2018-05-23 NOTE — TELEPHONE ENCOUNTER
Pt  Notified of platelet count and need for medrol dose frank  Rx  Pended  Pt   Will also call Hem/Onc to schedule f/u visit for elevated monocyte count

## 2018-05-23 NOTE — TELEPHONE ENCOUNTER
----- Message from Terrance De Leon MD sent at 5/23/2018 11:17 AM EDT -----  Platelets low - 83  Please advise patient  We can try a medrol pack and repeat at next visit

## 2018-05-24 ENCOUNTER — OFFICE VISIT (OUTPATIENT)
Dept: HEMATOLOGY ONCOLOGY | Facility: CLINIC | Age: 28
End: 2018-05-24
Payer: COMMERCIAL

## 2018-05-24 VITALS
HEART RATE: 88 BPM | BODY MASS INDEX: 30.05 KG/M2 | DIASTOLIC BLOOD PRESSURE: 72 MMHG | SYSTOLIC BLOOD PRESSURE: 116 MMHG | TEMPERATURE: 98 F | OXYGEN SATURATION: 99 % | WEIGHT: 176 LBS | HEIGHT: 64 IN | RESPIRATION RATE: 14 BRPM

## 2018-05-24 DIAGNOSIS — D69.6 DECREASED PLATELET COUNT (HCC): Primary | ICD-10-CM

## 2018-05-24 PROCEDURE — 99214 OFFICE O/P EST MOD 30 MIN: CPT | Performed by: INTERNAL MEDICINE

## 2018-05-24 NOTE — PROGRESS NOTES
Hematology / Oncology Outpatient Follow Up Note    Sreedhar Carroll 25 y o  female :1990 FF         Date:  2018    Assessment / Plan:  A 33-year-old premenopausal woman who is currently 37 weeks of pregnancy for her 1st baby  Her CBC in , when she was not pregnant, showed platelet count 102  Therefore, this is more likely to be mild immune thrombocytopenia  Her platelet count recently dropped slightly to 83  She remain to be asymptomatic from hematology standpoint  At this moment, I do not see any indication for hematologic intervention such as steroid  Regarding the epidural anesthesia, it is reported that risk of hematoma is only 0 2% if patient has platelet count between 70,000 to 100,000  Therefore, she may have epidural anesthesia as long was platelet count above 70,000 safely  If platelet count drops below 70,000, hematologic intervention may be indicated  She is in agreement         Subjective:      HPI:  A 33-year-old premenopausal woman who has no past medical history  She is currently 31 weeks of pregnancy for her 1st baby  In 2017, when she was 8 weeks of pregnancy, CBC showed minimal thrombocytopenia with platelet count 515  Otherwise, her CBC was completely normal  Her pregnancy has been uncomplicated  Recent CBC showed platelet count 904  Therefore, she was referred to me for further evaluation  She has no symptomatology from hematology standpoint  She particularly denied any bleeding symptoms such as epistaxis, gum bleeding  She always has some easy bruising in the lower extremity  She has hemostatic challenge with within tooth extraction when she was 23years old with no postoperative bleeding  She has no other major surgery  She has no family history of bleeding disorder  She has no family history of malignancy  Currently, she is in usual state of health  She feels well  She is afebrile  She has no complaint of pain    She denied respiratory symptoms  She has no history of hepatitis  HIV antibody and hepatitis B surface antigen was negative  Her performance status is normal  She told me that when she was not pregnant, her platelet count was normal, previously            Interval History:   A 33-year-old premenopausal woman who is currently 37 weeks of pregnancy  She during the pregnancy, she has been known to have mild thrombocytopenia without any symptoms  This was initially thought to be either gestational thrombocytopenia or mild immune thrombocytopenia  She accessed old CBC in 2016 which she did elsewhere which showed platelet count was 373  Therefore, this is more likely to be mild form of immune thrombocytopenia  Her platelet count was above 100, when I 1st saw her  Therefore, no treatment intervention was indicated  Her pregnancy has been completely uneventful  Her due date is in June 12, 2018  Her recent CBC showed platelet count 83  She has mild leukocytosis  She has no anemia  She presents today with her  to discuss this  She is asymptomatic from hematology standpoint  She denied bleeding  She has no fever, chills or night sweats  Her performance status is normal            Objective:      Primary Diagnosis:     Minimal thrombocytopenia, most likely gestational thrombocytopenia      Cancer Staging:  No matching staging information was found for the patient         Previous Hematologic/ Oncologic Treatment:            Current Hematologic/ Oncologic Treatment:       Observation      Disease Status:      NA     Test Results:     Pathology:           Radiology:           Laboratory:     See below  In November 2017, platelet count was 350  She has normal WBC, differential and hemoglobin      Physical Exam:        General Appearance:    Alert, oriented          Eyes:    PERRL   Ears:    Normal external ear canals, both ears   Nose:   Nares normal, septum midline   Throat:   Mucosa moist  Pharynx without injection      Neck: Supple         Lungs:     Clear to auscultation bilaterally   Chest Wall:    No tenderness or deformity    Heart:    Regular rate and rhythm         Abdomen:     Soft, non-tender, bowel sounds +, no organomegaly, as expected with her 37 weeks of pregnancy                Extremities:   Extremities no cyanosis or edema         Skin:   no rash or icterus  Lymph nodes:   Cervical, supraclavicular, and axillary nodes normal   Neurologic:   CNII-XII intact, normal strength, sensation and reflexes     Throughout             Breast exam:   NA           ROS: Review of Systems        Imaging: No results found  Labs:   Lab Results   Component Value Date    WBC 12 58 (H) 05/14/2018    HGB 12 9 05/14/2018    HCT 39 5 05/14/2018    MCV 93 05/14/2018    PLT  05/14/2018      Comment:      Not reported due to platelet clumping  No results found for: NA, K, CL, CO2, ANIONGAP, BUN, CREATININE, GLUCOSE, GLUF, CALCIUM, CORRECTEDCA, AST, ALT, ALKPHOS, PROT, ALBUMIN, BILITOT, EGFR      Current Medications: Reviewed  Allergies: Reviewed  PMH/FH/SH:  Reviewed      Vital Sign:    Body surface area is 1 84 meters squared      Wt Readings from Last 3 Encounters:   05/24/18 79 8 kg (176 lb)   05/22/18 79 8 kg (176 lb)   05/14/18 79 1 kg (174 lb 4 8 oz)        Temp Readings from Last 3 Encounters:   05/24/18 98 °F (36 7 °C) (Tympanic)   04/13/18 97 6 °F (36 4 °C) (Tympanic)        BP Readings from Last 3 Encounters:   05/24/18 116/72   05/22/18 126/60   05/14/18 120/72         Pulse Readings from Last 3 Encounters:   05/24/18 88   04/13/18 80   01/31/18 76     @LASTSAO2(3)@

## 2018-05-24 NOTE — LETTER
May 24, 2018     Galileo Arteaga MD  [address]    Patient: Jaylen Woods   YOB: 1990   Date of Visit: 2018       Dear Dr Phillpi Brush: Thank you for referring Jaylen Woods to me for evaluation  Below are my notes for this consultation  If you have questions, please do not hesitate to call me  I look forward to following your patient along with you  Sincerely,        Skyla Jeong MD        CC: No Recipients  Skyla Jeong MD  2018  3:29 PM  Sign at close encounter  Hematology / Oncology Outpatient Follow Up Note    Jaylen Woods 25 y o  female :1990 [id number]         Date:  2018    Assessment / Plan:  A 78-year-old premenopausal woman who is currently 37 weeks of pregnancy for her 1st baby  Her CBC in , when she was not pregnant, showed platelet count 971  Therefore, this is more likely to be mild immune thrombocytopenia  Her platelet count recently dropped slightly to 83  She remain to be asymptomatic from hematology standpoint  At this moment, I do not see any indication for hematologic intervention such as steroid  Regarding the epidural anesthesia, it is reported that risk of hematoma is only 0 2% if patient has platelet count between 70,000 to 100,000  Therefore, she may have epidural anesthesia as long was platelet count above 70,000 safely  If platelet count drops below 70,000, hematologic intervention may be indicated  She is in agreement         Subjective:      HPI:  A 78-year-old premenopausal woman who has no past medical history  She is currently 31 weeks of pregnancy for her 1st baby  In 2017, when she was 8 weeks of pregnancy, CBC showed minimal thrombocytopenia with platelet count 587  Otherwise, her CBC was completely normal  Her pregnancy has been uncomplicated  Recent CBC showed platelet count 703  Therefore, she was referred to me for further evaluation    She has no symptomatology from hematology standpoint  She particularly denied any bleeding symptoms such as epistaxis, gum bleeding  She always has some easy bruising in the lower extremity  She has hemostatic challenge with within tooth extraction when she was 23years old with no postoperative bleeding  She has no other major surgery  She has no family history of bleeding disorder  She has no family history of malignancy  Currently, she is in usual state of health  She feels well  She is afebrile  She has no complaint of pain  She denied respiratory symptoms  She has no history of hepatitis  HIV antibody and hepatitis B surface antigen was negative  Her performance status is normal  She told me that when she was not pregnant, her platelet count was normal, previously            Interval History:   A 49-year-old premenopausal woman who is currently 37 weeks of pregnancy  She during the pregnancy, she has been known to have mild thrombocytopenia without any symptoms  This was initially thought to be either gestational thrombocytopenia or mild immune thrombocytopenia  She accessed old CBC in 2016 which she did elsewhere which showed platelet count was 575  Therefore, this is more likely to be mild form of immune thrombocytopenia  Her platelet count was above 100, when I 1st saw her  Therefore, no treatment intervention was indicated  Her pregnancy has been completely uneventful  Her due date is in June 12, 2018  Her recent CBC showed platelet count 83  She has mild leukocytosis  She has no anemia  She presents today with her  to discuss this  She is asymptomatic from hematology standpoint  She denied bleeding  She has no fever, chills or night sweats    Her performance status is normal            Objective:      Primary Diagnosis:     Minimal thrombocytopenia, most likely gestational thrombocytopenia      Cancer Staging:  No matching staging information was found for the patient         Previous Hematologic/ Oncologic Treatment:            Current Hematologic/ Oncologic Treatment:       Observation      Disease Status:      NA     Test Results:     Pathology:           Radiology:           Laboratory:     See below  In November 2017, platelet count was 867  She has normal WBC, differential and hemoglobin      Physical Exam:        General Appearance:    Alert, oriented          Eyes:    PERRL   Ears:    Normal external ear canals, both ears   Nose:   Nares normal, septum midline   Throat:   Mucosa moist  Pharynx without injection  Neck:   Supple         Lungs:     Clear to auscultation bilaterally   Chest Wall:    No tenderness or deformity    Heart:    Regular rate and rhythm         Abdomen:     Soft, non-tender, bowel sounds +, no organomegaly, as expected with her 37 weeks of pregnancy                Extremities:   Extremities no cyanosis or edema         Skin:   no rash or icterus  Lymph nodes:   Cervical, supraclavicular, and axillary nodes normal   Neurologic:   CNII-XII intact, normal strength, sensation and reflexes     Throughout             Breast exam:   NA           ROS: Review of Systems        Imaging: No results found  Labs:   Lab Results   Component Value Date    WBC 12 58 (H) 05/14/2018    HGB 12 9 05/14/2018    HCT 39 5 05/14/2018    MCV 93 05/14/2018    PLT  05/14/2018      Comment:      Not reported due to platelet clumping  No results found for: NA, K, CL, CO2, ANIONGAP, BUN, CREATININE, GLUCOSE, GLUF, CALCIUM, CORRECTEDCA, AST, ALT, ALKPHOS, PROT, ALBUMIN, BILITOT, EGFR      Current Medications: Reviewed  Allergies: Reviewed  PMH/FH/SH:  Reviewed      Vital Sign:    Body surface area is 1 84 meters squared      Wt Readings from Last 3 Encounters:   05/24/18 79 8 kg (176 lb)   05/22/18 79 8 kg (176 lb)   05/14/18 79 1 kg (174 lb 4 8 oz)        Temp Readings from Last 3 Encounters:   05/24/18 98 °F (36 7 °C) (Tympanic)   04/13/18 97 6 °F (36 4 °C) (Tympanic)        BP Readings from Last 3 Encounters:   05/24/18 116/72   05/22/18 126/60   05/14/18 120/72         Pulse Readings from Last 3 Encounters:   05/24/18 88   04/13/18 80   01/31/18 76     @LASTSAO2(3)@

## 2018-05-30 ENCOUNTER — HOSPITAL ENCOUNTER (OUTPATIENT)
Facility: HOSPITAL | Age: 28
Discharge: HOME | End: 2018-05-30
Attending: OBSTETRICS & GYNECOLOGY | Admitting: OBSTETRICS & GYNECOLOGY
Payer: COMMERCIAL

## 2018-05-30 ENCOUNTER — ROUTINE PRENATAL (OUTPATIENT)
Dept: OBGYN CLINIC | Facility: MEDICAL CENTER | Age: 28
End: 2018-05-30
Payer: COMMERCIAL

## 2018-05-30 VITALS — SYSTOLIC BLOOD PRESSURE: 134 MMHG | BODY MASS INDEX: 31.21 KG/M2 | WEIGHT: 179 LBS | DIASTOLIC BLOOD PRESSURE: 64 MMHG

## 2018-05-30 VITALS — WEIGHT: 179 LBS | BODY MASS INDEX: 31.71 KG/M2 | TEMPERATURE: 98.1 F | HEIGHT: 63 IN

## 2018-05-30 DIAGNOSIS — Z34.93 THIRD TRIMESTER PREGNANCY: Primary | ICD-10-CM

## 2018-05-30 DIAGNOSIS — D69.6 DECREASED PLATELET COUNT (HCC): ICD-10-CM

## 2018-05-30 PROBLEM — Z3A.38 38 WEEKS GESTATION OF PREGNANCY: Status: ACTIVE | Noted: 2018-05-30

## 2018-05-30 LAB
BASOPHILS # BLD AUTO: 0.01 THOUSANDS/ΜL (ref 0–0.1)
BASOPHILS NFR BLD AUTO: 0 % (ref 0–1)
EOSINOPHIL # BLD AUTO: 0.07 THOUSAND/ΜL (ref 0–0.61)
EOSINOPHIL NFR BLD AUTO: 1 % (ref 0–6)
ERYTHROCYTE [DISTWIDTH] IN BLOOD BY AUTOMATED COUNT: 12.8 % (ref 11.6–15.1)
HCT VFR BLD AUTO: 38.8 % (ref 34.8–46.1)
HGB BLD-MCNC: 12.8 G/DL (ref 11.5–15.4)
IMM GRANULOCYTES # BLD AUTO: 0.08 THOUSAND/UL (ref 0–0.2)
IMM GRANULOCYTES NFR BLD AUTO: 1 % (ref 0–2)
LYMPHOCYTES # BLD AUTO: 2.88 THOUSANDS/ΜL (ref 0.6–4.47)
LYMPHOCYTES NFR BLD AUTO: 22 % (ref 14–44)
MCH RBC QN AUTO: 30.4 PG (ref 26.8–34.3)
MCHC RBC AUTO-ENTMCNC: 33 G/DL (ref 31.4–37.4)
MCV RBC AUTO: 92 FL (ref 82–98)
MONOCYTES # BLD AUTO: 1.92 THOUSAND/ΜL (ref 0.17–1.22)
MONOCYTES NFR BLD AUTO: 15 % (ref 4–12)
NEUTROPHILS # BLD AUTO: 8.09 THOUSANDS/ΜL (ref 1.85–7.62)
NEUTS SEG NFR BLD AUTO: 61 % (ref 43–75)
NRBC BLD AUTO-RTO: 0 /100 WBCS
PLATELET # BLD AUTO: 109 THOUSANDS/UL (ref 149–390)
PMV BLD AUTO: 10.7 FL (ref 8.9–12.7)
RBC # BLD AUTO: 4.21 MILLION/UL (ref 3.81–5.12)
WBC # BLD AUTO: 13.05 THOUSAND/UL (ref 4.31–10.16)

## 2018-05-30 PROCEDURE — PNV: Performed by: OBSTETRICS & GYNECOLOGY

## 2018-05-30 PROCEDURE — 85025 COMPLETE CBC W/AUTO DIFF WBC: CPT | Performed by: OBSTETRICS & GYNECOLOGY

## 2018-05-30 PROCEDURE — 99212 OFFICE O/P EST SF 10 MIN: CPT

## 2018-05-30 PROCEDURE — 76815 OB US LIMITED FETUS(S): CPT | Performed by: OBSTETRICS & GYNECOLOGY

## 2018-05-30 PROCEDURE — 36415 COLL VENOUS BLD VENIPUNCTURE: CPT | Performed by: OBSTETRICS & GYNECOLOGY

## 2018-05-30 NOTE — PROGRESS NOTES
Shannen Sheldon is a 25y o  year old  at 43w4d for routine prenatal visit    + FM, no vaginal bleeding, contractions, or LOF  Complaints: No   Most recent ultrasound and labs reviewed    Thrombocytopenia : cbc today / will call with results , did not take  medrol dose pack per recommendation of hematologist / will await results from today   [facility] reviewed   Labor precautions reviewed

## 2018-05-31 NOTE — PROCEDURES
Vic Forbes, a  at 38w2d with an NIRAV of 2018, by Last Menstrual Period, was seen at [facility] for the following procedure(s): $Procedure Type: CHERYL]         4 Quadrant CHERYL  CHERYL Q1 (cm): 1 2 cm  CHERYL Q2 (cm): 2 8 cm  CHERYL Q3 (cm): 2 1 cm  CHERYL Q4 (cm): 1 9 cm  CHERYL TOTAL (cm): 8 cm

## 2018-05-31 NOTE — PROGRESS NOTES
L&D Triage Note - OB/GYN  Luis Cousins 25 y o  female MRN: [id number]  Unit/Bed#: L&D 329-01 Encounter: [id number]      Assessment:  25 y o   at 38w1d with decreased fetal movment  Plan:  1  Decreased fetal movement   -Reactive NST   -CHERYL 8 0 cm   -fetal movement noted on ultrasound   -continue doing fetal kick counts  2  Follow-up with scheduled prenatal appointment    Case discussed with Dr queen  ______________________________________________________________________      Chief Compliant: "I have not felt the baby move much today"    TIME:   Subjective:  25 y o   at 38w1d complaining of decreased fetal movement  Patient states over the past several days she has noticed less fetal movement, however today she has felt minimal fetal movement after doing kick counts  She denies regular contractions but does note some cramping  No vaginal bleeding or loss of fluid        Objective:  Vitals:    18   Temp: 98 1 °F (36 7 °C)       SVE: FT / 60% / -3  FHT:  130 / Moderate 6 - 25 bpm / Reactive, mo decelerations, + accelerations  [place]: none noted          Joe Sun MD 2018 6:14 AM

## 2018-06-07 ENCOUNTER — ROUTINE PRENATAL (OUTPATIENT)
Dept: OBGYN CLINIC | Facility: MEDICAL CENTER | Age: 28
End: 2018-06-07

## 2018-06-07 VITALS — BODY MASS INDEX: 31.53 KG/M2 | SYSTOLIC BLOOD PRESSURE: 124 MMHG | DIASTOLIC BLOOD PRESSURE: 72 MMHG | WEIGHT: 178 LBS

## 2018-06-07 DIAGNOSIS — Z3A.38 38 WEEKS GESTATION OF PREGNANCY: ICD-10-CM

## 2018-06-07 DIAGNOSIS — Z34.03 ENCOUNTER FOR SUPERVISION OF NORMAL FIRST PREGNANCY IN THIRD TRIMESTER: Primary | ICD-10-CM

## 2018-06-07 PROCEDURE — PNV: Performed by: OBSTETRICS & GYNECOLOGY

## 2018-06-07 NOTE — PROGRESS NOTES
Shannen Sheldon is a 25y o  year old  at 44w2d for routine prenatal visit    + FM, no vaginal bleeding, contractions, or LOF  Complaints: No    Most recent ultrasound and labs reviewed    Labor precautions  Elective IOL -  cvx: /-2, mod, anterior

## 2018-06-13 ENCOUNTER — HOSPITAL ENCOUNTER (OUTPATIENT)
Dept: LABOR AND DELIVERY | Facility: HOSPITAL | Age: 28
Discharge: HOME | DRG: 781 | End: 2018-06-13
Payer: COMMERCIAL

## 2018-06-13 ENCOUNTER — HOSPITAL ENCOUNTER (INPATIENT)
Facility: HOSPITAL | Age: 28
LOS: 1 days | Discharge: HOME | DRG: 781 | End: 2018-06-14
Attending: OBSTETRICS & GYNECOLOGY | Admitting: OBSTETRICS & GYNECOLOGY
Payer: COMMERCIAL

## 2018-06-13 DIAGNOSIS — Z3A.40 40 WEEKS GESTATION OF PREGNANCY: Primary | ICD-10-CM

## 2018-06-14 VITALS
HEIGHT: 63 IN | TEMPERATURE: 97.9 F | DIASTOLIC BLOOD PRESSURE: 70 MMHG | HEART RATE: 68 BPM | SYSTOLIC BLOOD PRESSURE: 120 MMHG | BODY MASS INDEX: 31.54 KG/M2 | WEIGHT: 178 LBS | RESPIRATION RATE: 18 BRPM

## 2018-06-14 LAB
ABO GROUP BLD: NORMAL
BLD GP AB SCN SERPL QL: NEGATIVE
EOSINOPHIL NFR BLD AUTO: 1 % (ref 0–6)
ERYTHROCYTE [DISTWIDTH] IN BLOOD BY AUTOMATED COUNT: 13.7 % (ref 11.6–15.1)
HCT VFR BLD AUTO: 37 % (ref 34.8–46.1)
HGB BLD-MCNC: 12.5 G/DL (ref 11.5–15.4)
LYMPHOCYTES NFR BLD AUTO: 32 % (ref 14–44)
MCH RBC QN AUTO: 30.4 PG (ref 26.8–34.3)
MCHC RBC AUTO-ENTMCNC: 33.8 G/DL (ref 31.4–37.4)
MCV RBC AUTO: 90 FL (ref 82–98)
MONOCYTES NFR BLD AUTO: 10 % (ref 4–12)
NEUTS SEG NFR BLD AUTO: 57 % (ref 43–75)
NRBC BLD AUTO-RTO: 0 /100 WBCS
PLATELET # BLD AUTO: 95 THOUSANDS/UL (ref 149–390)
PMV BLD AUTO: 9.8 FL (ref 8.9–12.7)
RBC # BLD AUTO: 4.11 MILLION/UL (ref 3.81–5.12)
RH BLD: POSITIVE
RPR SER QL: NORMAL
SPECIMEN EXPIRATION DATE: NORMAL
WBC # BLD AUTO: 11.18 THOUSAND/UL (ref 4.31–10.16)

## 2018-06-14 PROCEDURE — 86850 RBC ANTIBODY SCREEN: CPT | Performed by: OBSTETRICS & GYNECOLOGY

## 2018-06-14 PROCEDURE — 86592 SYPHILIS TEST NON-TREP QUAL: CPT | Performed by: OBSTETRICS & GYNECOLOGY

## 2018-06-14 PROCEDURE — 86900 BLOOD TYPING SEROLOGIC ABO: CPT | Performed by: OBSTETRICS & GYNECOLOGY

## 2018-06-14 PROCEDURE — 4A1HXCZ MONITORING OF PRODUCTS OF CONCEPTION, CARDIAC RATE, EXTERNAL APPROACH: ICD-10-PCS | Performed by: OBSTETRICS & GYNECOLOGY

## 2018-06-14 PROCEDURE — 3E033VJ INTRODUCTION OF OTHER HORMONE INTO PERIPHERAL VEIN, PERCUTANEOUS APPROACH: ICD-10-PCS | Performed by: OBSTETRICS & GYNECOLOGY

## 2018-06-14 PROCEDURE — 85025 COMPLETE CBC W/AUTO DIFF WBC: CPT | Performed by: OBSTETRICS & GYNECOLOGY

## 2018-06-14 PROCEDURE — 86901 BLOOD TYPING SEROLOGIC RH(D): CPT | Performed by: OBSTETRICS & GYNECOLOGY

## 2018-06-14 RX ORDER — SODIUM CHLORIDE, SODIUM LACTATE, POTASSIUM CHLORIDE, CALCIUM CHLORIDE 600; 310; 30; 20 MG/100ML; MG/100ML; MG/100ML; MG/100ML
125 INJECTION, SOLUTION INTRAVENOUS CONTINUOUS
Status: DISCONTINUED | OUTPATIENT
Start: 2018-06-14 | End: 2018-06-15 | Stop reason: HOSPADM

## 2018-06-14 RX ORDER — OXYTOCIN/RINGER'S LACTATE 30/500 ML
1-30 PLASTIC BAG, INJECTION (ML) INTRAVENOUS
Status: DISCONTINUED | OUTPATIENT
Start: 2018-06-14 | End: 2018-06-15 | Stop reason: HOSPADM

## 2018-06-14 RX ADMIN — Medication 4 MILLI-UNITS/MIN: at 05:24

## 2018-06-14 RX ADMIN — SODIUM CHLORIDE 5 MILLION UNITS: 0.9 INJECTION, SOLUTION INTRAVENOUS at 00:50

## 2018-06-14 RX ADMIN — SODIUM CHLORIDE 2.5 MILLION UNITS: 9 INJECTION, SOLUTION INTRAVENOUS at 18:18

## 2018-06-14 RX ADMIN — Medication 2 MILLI-UNITS/MIN: at 04:54

## 2018-06-14 RX ADMIN — MISOPROSTOL 25 MCG: 100 TABLET ORAL at 00:39

## 2018-06-14 RX ADMIN — SODIUM CHLORIDE 2.5 MILLION UNITS: 9 INJECTION, SOLUTION INTRAVENOUS at 14:16

## 2018-06-14 RX ADMIN — SODIUM CHLORIDE 2.5 MILLION UNITS: 9 INJECTION, SOLUTION INTRAVENOUS at 04:53

## 2018-06-14 RX ADMIN — Medication 6 MILLI-UNITS/MIN: at 06:02

## 2018-06-14 RX ADMIN — SODIUM CHLORIDE, SODIUM LACTATE, POTASSIUM CHLORIDE, AND CALCIUM CHLORIDE 125 ML/HR: .6; .31; .03; .02 INJECTION, SOLUTION INTRAVENOUS at 00:50

## 2018-06-14 RX ADMIN — SODIUM CHLORIDE 2.5 MILLION UNITS: 9 INJECTION, SOLUTION INTRAVENOUS at 10:13

## 2018-06-14 RX ADMIN — SODIUM CHLORIDE, SODIUM LACTATE, POTASSIUM CHLORIDE, AND CALCIUM CHLORIDE 125 ML/HR: .6; .31; .03; .02 INJECTION, SOLUTION INTRAVENOUS at 14:02

## 2018-06-14 NOTE — OB LABOR/OXYTOCIN SAFETY PROGRESS
Oxytocin Safety Progress Check Note - Augusto Kebede 25 y o  female MRN: [id number]    Unit/Bed#: L&D 322-01 Encounter: [id number]    Obstetric History       T0      L0     SAB0   TAB0   Ectopic0   Multiple0   Live Births0      Gestational Age: 41w4d  Dose (regina-units/min) Oxytocin: 6 regina-units/min  Contraction Frequency (minutes): 3-6 5  Contraction Quality: Mild  Tachysystole: No   Dilation: 1        Effacement (%): 60  Station: -2  Baseline Rate: 135 bpm     FHR Category: Category I          Notes/comments:   Patient unchanged from previous cervical exam   Currently comfortable with contractions  Will continue with Pitocin titration  Currently category 1 fetal heart tracing            Lisandra Pompa MD 2018 9:51 AM

## 2018-06-14 NOTE — OB LABOR/OXYTOCIN SAFETY PROGRESS
Labor Progress Note - Emmie Laser 25 y o  female MRN: [id number]    Unit/Bed#: L&D 322-01 Encounter: [id number]    Obstetric History       T0      L0     SAB0   TAB0   Ectopic0   Multiple0   Live Births0      Gestational Age: 41w4d     Contraction Frequency (minutes): 2-4  Contraction Quality: Mild  Tachysystole: No   Dilation: 1        Effacement (%): 50  Station: -2  Baseline Rate: 130 bpm     FHR Category: Category I          Notes/comments:      patient is unchanged on exam  However she is lety too regularly for another dose of pitocin   Will consider starting low dose pitocin at this time   DW dr Jay Lopez MT 2018 4:14 AM

## 2018-06-14 NOTE — OB LABOR/OXYTOCIN SAFETY PROGRESS
Oxytocin Safety Progress Check Note - Chris Tyler 25 y o  female MRN: [id number]    Unit/Bed#: L&D 322-01 Encounter: [id number]    Obstetric History       T0      L0     SAB0   TAB0   Ectopic0   Multiple0   Live Births0      Gestational Age: 41w4d  Dose (regina-units/min) Oxytocin: 6 regina-units/min  Contraction Frequency (minutes): 3-6 5  Contraction Quality: Mild  Tachysystole: No   Dilation: 1        Effacement (%): 50  Station: -2  Baseline Rate: 135 bpm     FHR Category: Category I          Notes/comments:      Please continue pitocin titration  ABBY Avelar MT 2018 6:59 AM

## 2018-06-14 NOTE — OB LABOR/OXYTOCIN SAFETY PROGRESS
Oxytocin Safety Progress Check Note - Amy North 25 y o  female MRN: [id number]    Unit/Bed#: L&D 322-01 Encounter: [id number]    Obstetric History       T0      L0     SAB0   TAB0   Ectopic0   Multiple0   Live Births0      Gestational Age: 41w4d  Dose (regina-units/min) Oxytocin: 15 regina-units/min  Contractions: every 1 5-2mins  Contraction Quality: Moderate  Tachysystole: No   Dilation: 2        Effacement (%): 60  Station: -3  Baseline Rate: 130 bpm     FHR Category: Category I          Notes/comments:   Pt feeling contractions, but tolerable  SVE unchanged  FHT category I  Continue current management   Dr Brennen Harry notified          Karie Salas MD 2018 6:35 PM

## 2018-06-14 NOTE — H&P
H&P Exam - Obstetrics   Sofi Kessler 25 y o  female MRN: [id number]  Unit/Bed#: L&D 322-01 Encounter: [id number]    Assessment/Plan     Assessment:  25 y o  at 41w4d with GBS + status and thrombocytopenia for induction of labor    Plan:  1) Admit to L &D for IOL   - CBC, RPR, type and screen   - IVF  cc/hr for hydration   - External uterine monitoring and external fetal heart tones   - Vaginal cytotec 25 mcg and recheck cervix       History of Present Illness      Chief Complaint: " Elective induction of labor"    HPI:  Sofi Kessler is a 25 y o   female with an NIRAV of 2018, by Last Menstrual Period at 40w2d weeks gestation who is being admitted for elective IOL  Her current obstetrical history is significant for GBS colonizer, thrombocytopenia  Contractions: Occasional   Leakage of fluid: None  Bleeding: None  Fetal movement: present  Pregnancy complications: Thrombocytopenia, GBS + status  Review of Systems   Constitutional: Negative for chills and fever  HENT: Negative  Eyes: Negative for visual disturbance  Respiratory: Negative for cough, shortness of breath and wheezing  Cardiovascular: Negative for chest pain, palpitations and leg swelling  Gastrointestinal: Negative for diarrhea, nausea and vomiting  Genitourinary: Negative for dysuria and hematuria  Musculoskeletal: Negative  Skin: Negative for rash  Neurological: Negative for seizures and headaches  Hematological: Does not bruise/bleed easily  Psychiatric/Behavioral: Negative          Historical Information   OB History    Para Term  AB Living   1             SAB TAB Ectopic Multiple Live Births                  # Outcome Date GA Lbr Rajinder/2nd Weight Sex Delivery Anes PTL Lv   1 Current                 Baby complications/comments:   Past Medical History:   Diagnosis Date    Gestational thrombocytopenia ([facility] [address] )     Varicella      Past Surgical History:   Procedure Laterality Date    WISCODY TOOTH EXTRACTION       Social History   History   Alcohol Use No     History   Drug Use No     History   Smoking Status    Never Smoker   Smokeless Tobacco    Never Used     Family History: non-contributory    Meds/Allergies   all medications and allergies reviewed  No Known Allergies    Objective   Vitals: Temperature 98 3 °F (36 8 °C), temperature source Oral, resp  rate 16, height 5' 3" (1 6 m), weight 80 7 kg (178 lb), last menstrual period 09/05/2017, currently breastfeeding  Body mass index is 31 53 kg/m²  Invasive Devices     Peripheral Intravenous Line            Peripheral IV 06/14/18 Right Wrist less than 1 day                Physical Exam   Constitutional: She is oriented to person, place, and time  She appears well-developed and well-nourished  No distress  HENT:   Head: Normocephalic and atraumatic  Mouth/Throat: No oropharyngeal exudate  Eyes: Pupils are equal, round, and reactive to light  Right eye exhibits no discharge  Left eye exhibits no discharge  Neck: Normal range of motion  Cardiovascular: Normal rate and regular rhythm  Exam reveals no gallop and no friction rub  No murmur heard  Pulmonary/Chest: Effort normal and breath sounds normal  She has no wheezes  She has no rales  Abdominal: There is no tenderness  Musculoskeletal: Normal range of motion  She exhibits no edema  Lymphadenopathy:     She has no cervical adenopathy  Neurological: She is alert and oriented to person, place, and time  Skin: Skin is warm  No rash noted  She is not diaphoretic  Psychiatric: She has a normal mood and affect  Prenatal Labs: I have personally reviewed pertinent reports    , Blood Type:   Lab Results   Component Value Date/Time    ABO Grouping O 06/14/2018 12:48 AM    ABO Grouping O 11/06/2017 12:00 AM     , D (Rh type):   Lab Results   Component Value Date/Time    Rh Factor Positive 06/14/2018 12:48 AM    Rh Factor Positive 11/06/2017 12:00 AM     , Antibody Screen:   Lab Results   Component Value Date/Time    Antibody Screen Negative 06/14/2018 12:48 AM    Antibody Screen Negative 11/06/2017 12:00 AM    , HCT/HGB:   Lab Results   Component Value Date/Time    Hematocrit 37 0 06/14/2018 12:48 AM    Hematocrit 39 5 11/06/2017 12:00 AM    Hemoglobin 12 5 06/14/2018 12:48 AM    Hemoglobin 13 2 11/06/2017 12:00 AM      , MCV:   Lab Results   Component Value Date/Time    MCV 90 06/14/2018 12:48 AM    MCV 91 11/06/2017 12:00 AM      , Platelets:   Lab Results   Component Value Date/Time    Platelets 95 (L) 75/26/0473 12:48 AM    Platelets 736 (L) 28/80/2327 12:00 AM      , 1 hour Glucola:   Lab Results   Component Value Date/Time    Glucose 93 03/21/2018 09:12 AM   , Rubella: No results found for: RUBELLAIGGQT     , VDRL/RPR:   Lab Results   Component Value Date/Time    RPR SCREEN Non Reactive 11/06/2017 12:00 AM      , Hep B:   Lab Results   Component Value Date/Time    HEPATITIS B SURFACE ANTIGEN Negative 11/06/2017 12:00 AM     , HIV:   Lab Results   Component Value Date/Time    HIV-1/2 AB-AG Non Reactive 11/06/2017 12:00 AM     , Chlamydia: No results found for: EXTCHLAMYDIA  , Gonorrhea:   Lab Results   Component Value Date/Time    N GONORRHOEAE, AMPLIFIED DNA Negative 12/14/2017 12:00 AM     , Group B Strep:    Lab Results   Component Value Date/Time    Strep Grp B PCR Positive for Beta Hemolytic Strep Grp B by PCR (A) 05/14/2018 02:10 PM          Imaging, EKG, Pathology, and Other Studies: I have personally reviewed pertinent reports

## 2018-06-14 NOTE — OB LABOR/OXYTOCIN SAFETY PROGRESS
Labor Progress Note - Marquise Dewitt 25 y o  female MRN: [id number]    Unit/Bed#: L&D 322-01 Encounter: [id number]    Obstetric History       T0      L0     SAB0   TAB0   Ectopic0   Multiple0   Live Births0      Gestational Age: 41w4d     Contraction Frequency (minutes): occasional with irritability  Contraction Quality: Mild  Tachysystole: No   Dilation: 1        Effacement (%): 50  Station: -2  Baseline Rate: 130 bpm     FHR Category: Category I          Notes/comments:      Cytotec 25 mcg placed vaginally    Dr Matty Chow MT 2018 12:43 AM

## 2018-06-14 NOTE — OB LABOR/OXYTOCIN SAFETY PROGRESS
Oxytocin Safety Progress Check Note - Amy North 25 y o  female MRN: [id number]    Unit/Bed#: L&D 322-01 Encounter: [id number]    Obstetric History       T0      L0     SAB0   TAB0   Ectopic0   Multiple0   Live Births0      Gestational Age: 41w4d  Dose (regina-units/min) Oxytocin: 10 regina-units/min  Contraction Frequency (minutes): 5  Contraction Quality: Moderate  Tachysystole: No   Dilation: 2        Effacement (%): 60  Station: -3  Baseline Rate: 135 bpm     FHR Category: Category I          Notes/comments:   Continue to titrate pistocin 1 by 1 every 30  May have epidural when desired           Lennox Guard, MD 2018 12:20 PM

## 2018-06-14 NOTE — OB LABOR/OXYTOCIN SAFETY PROGRESS
Labor Progress Note - Miguel Banerjee 25 y o  female MRN: [id number]    Unit/Bed#: L&D 322-01 Encounter: [id number]    Obstetric History       T0      L0     SAB0   TAB0   Ectopic0   Multiple0   Live Births0      Gestational Age: 41w4d  Dose (regina-units/min) Oxytocin: 13 regina-units/min  Contraction Frequency (minutes): 5-7  Contraction Quality: Moderate  Tachysystole: No   Dilation: 2        Effacement (%): 60  Station: -3  Baseline Rate: 130 bpm     FHR Category: Category I          Notes/comments:      Pt has not made change  Pitocin only increased by 3milli-units since prior check  Pt still comfortable  Will continue to titrate pitocin per Dr Jeni Rock orders and recheck in 2 hours  Will update Dr Cathi Swenson accordingly  Of note patient's cervix is posterior on palpation but located anteriorly closer to pubic bone than sacrum         Shanti Sanchez DO 2018 3:32 PM

## 2018-06-16 ENCOUNTER — HOSPITAL ENCOUNTER (INPATIENT)
Facility: HOSPITAL | Age: 28
LOS: 3 days | Discharge: HOME | End: 2018-06-19
Attending: OBSTETRICS & GYNECOLOGY | Admitting: OBSTETRICS & GYNECOLOGY
Payer: COMMERCIAL

## 2018-06-16 ENCOUNTER — ANESTHESIA (INPATIENT)
Dept: LABOR AND DELIVERY | Facility: HOSPITAL | Age: 28
End: 2018-06-16
Payer: COMMERCIAL

## 2018-06-16 ENCOUNTER — ANESTHESIA EVENT (INPATIENT)
Dept: LABOR AND DELIVERY | Facility: HOSPITAL | Age: 28
End: 2018-06-16
Payer: COMMERCIAL

## 2018-06-16 ENCOUNTER — HOSPITAL ENCOUNTER (INPATIENT)
Dept: LABOR AND DELIVERY | Facility: HOSPITAL | Age: 28
Discharge: HOME | End: 2018-06-16
Payer: COMMERCIAL

## 2018-06-16 DIAGNOSIS — Z3A.40 40 WEEKS GESTATION OF PREGNANCY: ICD-10-CM

## 2018-06-16 LAB
ABO GROUP BLD: NORMAL
BASOPHILS # BLD AUTO: 0 THOUSANDS/ΜL (ref 0–0.1)
BASOPHILS NFR BLD AUTO: 0 % (ref 0–1)
BLD GP AB SCN SERPL QL: NEGATIVE
EOSINOPHIL # BLD AUTO: 0.07 THOUSAND/ΜL (ref 0–0.61)
EOSINOPHIL NFR BLD AUTO: 1 % (ref 0–6)
ERYTHROCYTE [DISTWIDTH] IN BLOOD BY AUTOMATED COUNT: 13.5 % (ref 11.6–15.1)
HCT VFR BLD AUTO: 36.6 % (ref 34.8–46.1)
HGB BLD-MCNC: 12.3 G/DL (ref 11.5–15.4)
LYMPHOCYTES # BLD AUTO: 2.91 THOUSANDS/ΜL (ref 0.6–4.47)
LYMPHOCYTES NFR BLD AUTO: 27 % (ref 14–44)
MCH RBC QN AUTO: 30.2 PG (ref 26.8–34.3)
MCHC RBC AUTO-ENTMCNC: 33.6 G/DL (ref 31.4–37.4)
MCV RBC AUTO: 90 FL (ref 82–98)
MONOCYTES # BLD AUTO: 1.25 THOUSAND/ΜL (ref 0.17–1.22)
MONOCYTES NFR BLD AUTO: 11 % (ref 4–12)
NEUTROPHILS # BLD AUTO: 6.77 THOUSANDS/ΜL (ref 1.85–7.62)
NEUTS SEG NFR BLD AUTO: 62 % (ref 43–75)
NRBC BLD AUTO-RTO: 0 /100 WBCS
PLATELET # BLD AUTO: 81 THOUSANDS/UL (ref 149–390)
PMV BLD AUTO: 10.2 FL (ref 8.9–12.7)
RBC # BLD AUTO: 4.07 MILLION/UL (ref 3.81–5.12)
RH BLD: POSITIVE
SPECIMEN EXPIRATION DATE: NORMAL
WBC # BLD AUTO: 11 THOUSAND/UL (ref 4.31–10.16)

## 2018-06-16 PROCEDURE — 85025 COMPLETE CBC W/AUTO DIFF WBC: CPT | Performed by: OBSTETRICS & GYNECOLOGY

## 2018-06-16 PROCEDURE — 86850 RBC ANTIBODY SCREEN: CPT | Performed by: OBSTETRICS & GYNECOLOGY

## 2018-06-16 PROCEDURE — 86900 BLOOD TYPING SEROLOGIC ABO: CPT | Performed by: OBSTETRICS & GYNECOLOGY

## 2018-06-16 PROCEDURE — 86592 SYPHILIS TEST NON-TREP QUAL: CPT | Performed by: OBSTETRICS & GYNECOLOGY

## 2018-06-16 PROCEDURE — 86901 BLOOD TYPING SEROLOGIC RH(D): CPT | Performed by: OBSTETRICS & GYNECOLOGY

## 2018-06-16 PROCEDURE — 4A1HXCZ MONITORING OF PRODUCTS OF CONCEPTION, CARDIAC RATE, EXTERNAL APPROACH: ICD-10-PCS | Performed by: OBSTETRICS & GYNECOLOGY

## 2018-06-16 PROCEDURE — 3E0P7VZ INTRODUCTION OF HORMONE INTO FEMALE REPRODUCTIVE, VIA NATURAL OR ARTIFICIAL OPENING: ICD-10-PCS | Performed by: OBSTETRICS & GYNECOLOGY

## 2018-06-16 RX ORDER — ROPIVACAINE HYDROCHLORIDE 2 MG/ML
INJECTION, SOLUTION EPIDURAL; INFILTRATION; PERINEURAL
Status: COMPLETED
Start: 2018-06-16 | End: 2018-06-16

## 2018-06-16 RX ORDER — SODIUM CHLORIDE, SODIUM LACTATE, POTASSIUM CHLORIDE, CALCIUM CHLORIDE 600; 310; 30; 20 MG/100ML; MG/100ML; MG/100ML; MG/100ML
125 INJECTION, SOLUTION INTRAVENOUS CONTINUOUS
Status: DISCONTINUED | OUTPATIENT
Start: 2018-06-16 | End: 2018-06-17

## 2018-06-16 RX ORDER — ROPIVACAINE HYDROCHLORIDE 2 MG/ML
INJECTION, SOLUTION EPIDURAL; INFILTRATION; PERINEURAL AS NEEDED
Status: DISCONTINUED | OUTPATIENT
Start: 2018-06-16 | End: 2018-06-17 | Stop reason: SURG

## 2018-06-16 RX ORDER — LIDOCAINE HYDROCHLORIDE AND EPINEPHRINE 20; 5 MG/ML; UG/ML
INJECTION, SOLUTION EPIDURAL; INFILTRATION; INTRACAUDAL; PERINEURAL AS NEEDED
Status: DISCONTINUED | OUTPATIENT
Start: 2018-06-16 | End: 2018-06-17 | Stop reason: SURG

## 2018-06-16 RX ADMIN — MISOPROSTOL 25 MCG: 100 TABLET ORAL at 21:01

## 2018-06-16 RX ADMIN — LIDOCAINE HYDROCHLORIDE,EPINEPHRINE BITARTRATE 3 ML: 20; .005 INJECTION, SOLUTION EPIDURAL; INFILTRATION; INTRACAUDAL; PERINEURAL at 22:50

## 2018-06-16 RX ADMIN — ROPIVACAINE HYDROCHLORIDE 100 ML: 2 INJECTION, SOLUTION EPIDURAL; INFILTRATION at 23:01

## 2018-06-16 RX ADMIN — LIDOCAINE HYDROCHLORIDE,EPINEPHRINE BITARTRATE 3 ML: 20; .005 INJECTION, SOLUTION EPIDURAL; INFILTRATION; INTRACAUDAL; PERINEURAL at 22:58

## 2018-06-16 RX ADMIN — SODIUM CHLORIDE 5 MILLION UNITS: 0.9 INJECTION, SOLUTION INTRAVENOUS at 20:46

## 2018-06-16 RX ADMIN — SODIUM CHLORIDE, SODIUM LACTATE, POTASSIUM CHLORIDE, AND CALCIUM CHLORIDE 125 ML/HR: .6; .31; .03; .02 INJECTION, SOLUTION INTRAVENOUS at 23:35

## 2018-06-16 RX ADMIN — ROPIVACAINE HYDROCHLORIDE 10 ML: 2 INJECTION, SOLUTION EPIDURAL; INFILTRATION at 23:01

## 2018-06-16 RX ADMIN — SODIUM CHLORIDE, SODIUM LACTATE, POTASSIUM CHLORIDE, AND CALCIUM CHLORIDE 125 ML/HR: .6; .31; .03; .02 INJECTION, SOLUTION INTRAVENOUS at 20:44

## 2018-06-17 LAB
ABO GROUP BLD BPU: NORMAL
BASE EXCESS BLDCOA CALC-SCNC: -9.6 MMOL/L (ref 3–11)
BASE EXCESS BLDCOV CALC-SCNC: -9 MMOL/L (ref 1–9)
BASOPHILS # BLD AUTO: 0.01 THOUSANDS/ΜL (ref 0–0.1)
BASOPHILS NFR BLD AUTO: 0 % (ref 0–1)
BPU ID: NORMAL
EOSINOPHIL # BLD AUTO: 0 THOUSAND/ΜL (ref 0–0.61)
EOSINOPHIL NFR BLD AUTO: 0 % (ref 0–6)
ERYTHROCYTE [DISTWIDTH] IN BLOOD BY AUTOMATED COUNT: 13.6 % (ref 11.6–15.1)
HCO3 BLDCOA-SCNC: 18.9 MMOL/L (ref 17.3–27.3)
HCO3 BLDCOV-SCNC: 18.1 MMOL/L (ref 12.2–28.6)
HCT VFR BLD AUTO: 35.9 % (ref 34.8–46.1)
HGB BLD-MCNC: 12.1 G/DL (ref 11.5–15.4)
LYMPHOCYTES # BLD AUTO: 1.48 THOUSANDS/ΜL (ref 0.6–4.47)
LYMPHOCYTES NFR BLD AUTO: 9 % (ref 14–44)
MCH RBC QN AUTO: 30.6 PG (ref 26.8–34.3)
MCHC RBC AUTO-ENTMCNC: 33.7 G/DL (ref 31.4–37.4)
MCV RBC AUTO: 91 FL (ref 82–98)
MONOCYTES # BLD AUTO: 2.09 THOUSAND/ΜL (ref 0.17–1.22)
MONOCYTES NFR BLD AUTO: 13 % (ref 4–12)
NEUTROPHILS # BLD AUTO: 12.36 THOUSANDS/ΜL (ref 1.85–7.62)
NEUTS SEG NFR BLD AUTO: 78 % (ref 43–75)
NRBC BLD AUTO-RTO: 0 /100 WBCS
O2 CT VFR BLDCOA CALC: 4.4 ML/DL
OXYHGB MFR BLDCOA: 20.5 %
OXYHGB MFR BLDCOV: 28.6 %
PCO2 BLDCOA: 51.2 MM[HG] (ref 30–60)
PCO2 BLDCOV: 43 MM HG (ref 27–43)
PH BLDCOA: 7.18 [PH] (ref 7.23–7.43)
PH BLDCOV: 7.24 [PH] (ref 7.19–7.49)
PLATELET # BLD AUTO: 71 THOUSANDS/UL (ref 149–390)
PMV BLD AUTO: 10.4 FL (ref 8.9–12.7)
PO2 BLDCOA: 15.2 MM HG (ref 5–25)
PO2 BLDCOV: 17.4 MM HG (ref 15–45)
RBC # BLD AUTO: 3.96 MILLION/UL (ref 3.81–5.12)
RPR SER QL: NORMAL
SAO2 % BLDCOV: 5.8 ML/DL
UNIT DISPENSE STATUS: NORMAL
UNIT PRODUCT CODE: NORMAL
UNIT RH: NORMAL
WBC # BLD AUTO: 15.94 THOUSAND/UL (ref 4.31–10.16)

## 2018-06-17 PROCEDURE — 85025 COMPLETE CBC W/AUTO DIFF WBC: CPT | Performed by: OBSTETRICS & GYNECOLOGY

## 2018-06-17 PROCEDURE — 82805 BLOOD GASES W/O2 SATURATION: CPT | Performed by: OBSTETRICS & GYNECOLOGY

## 2018-06-17 PROCEDURE — 3E033VJ INTRODUCTION OF OTHER HORMONE INTO PERIPHERAL VEIN, PERCUTANEOUS APPROACH: ICD-10-PCS | Performed by: OBSTETRICS & GYNECOLOGY

## 2018-06-17 PROCEDURE — 59400 OBSTETRICAL CARE: CPT | Performed by: OBSTETRICS & GYNECOLOGY

## 2018-06-17 PROCEDURE — 0KQM0ZZ REPAIR PERINEUM MUSCLE, OPEN APPROACH: ICD-10-PCS | Performed by: OBSTETRICS & GYNECOLOGY

## 2018-06-17 RX ORDER — ONDANSETRON 2 MG/ML
4 INJECTION INTRAMUSCULAR; INTRAVENOUS EVERY 8 HOURS PRN
Status: DISCONTINUED | OUTPATIENT
Start: 2018-06-17 | End: 2018-06-19 | Stop reason: HOSPADM

## 2018-06-17 RX ORDER — OXYCODONE HYDROCHLORIDE AND ACETAMINOPHEN 5; 325 MG/1; MG/1
2 TABLET ORAL EVERY 4 HOURS PRN
Status: DISCONTINUED | OUTPATIENT
Start: 2018-06-17 | End: 2018-06-19 | Stop reason: HOSPADM

## 2018-06-17 RX ORDER — OXYCODONE HYDROCHLORIDE AND ACETAMINOPHEN 5; 325 MG/1; MG/1
1 TABLET ORAL EVERY 4 HOURS PRN
Status: DISCONTINUED | OUTPATIENT
Start: 2018-06-17 | End: 2018-06-19 | Stop reason: HOSPADM

## 2018-06-17 RX ORDER — CALCIUM CARBONATE 200(500)MG
1000 TABLET,CHEWABLE ORAL DAILY PRN
Status: DISCONTINUED | OUTPATIENT
Start: 2018-06-17 | End: 2018-06-19 | Stop reason: HOSPADM

## 2018-06-17 RX ORDER — ACETAMINOPHEN 325 MG/1
650 TABLET ORAL EVERY 6 HOURS PRN
Status: DISCONTINUED | OUTPATIENT
Start: 2018-06-17 | End: 2018-06-19 | Stop reason: HOSPADM

## 2018-06-17 RX ORDER — IBUPROFEN 600 MG/1
600 TABLET ORAL EVERY 6 HOURS PRN
Status: DISCONTINUED | OUTPATIENT
Start: 2018-06-17 | End: 2018-06-19 | Stop reason: HOSPADM

## 2018-06-17 RX ORDER — DOCUSATE SODIUM 100 MG/1
100 CAPSULE, LIQUID FILLED ORAL 2 TIMES DAILY
Status: DISCONTINUED | OUTPATIENT
Start: 2018-06-17 | End: 2018-06-19 | Stop reason: HOSPADM

## 2018-06-17 RX ORDER — SIMETHICONE 80 MG
80 TABLET,CHEWABLE ORAL 4 TIMES DAILY PRN
Status: DISCONTINUED | OUTPATIENT
Start: 2018-06-17 | End: 2018-06-19 | Stop reason: HOSPADM

## 2018-06-17 RX ORDER — OXYTOCIN/RINGER'S LACTATE 30/500 ML
1-10 PLASTIC BAG, INJECTION (ML) INTRAVENOUS
Status: DISCONTINUED | OUTPATIENT
Start: 2018-06-17 | End: 2018-06-17

## 2018-06-17 RX ORDER — DIPHENHYDRAMINE HCL 25 MG
25 TABLET ORAL EVERY 6 HOURS PRN
Status: DISCONTINUED | OUTPATIENT
Start: 2018-06-17 | End: 2018-06-19 | Stop reason: HOSPADM

## 2018-06-17 RX ORDER — DIAPER,BRIEF,INFANT-TODD,DISP
1 EACH MISCELLANEOUS AS NEEDED
Status: DISCONTINUED | OUTPATIENT
Start: 2018-06-17 | End: 2018-06-19 | Stop reason: HOSPADM

## 2018-06-17 RX ADMIN — IBUPROFEN 600 MG: 600 TABLET ORAL at 20:40

## 2018-06-17 RX ADMIN — ROPIVACAINE HYDROCHLORIDE: 2 INJECTION, SOLUTION EPIDURAL; INFILTRATION at 11:53

## 2018-06-17 RX ADMIN — HYDROCORTISONE 1 APPLICATION: 1 CREAM TOPICAL at 20:41

## 2018-06-17 RX ADMIN — SODIUM CHLORIDE 2.5 MILLION UNITS: 9 INJECTION, SOLUTION INTRAVENOUS at 11:52

## 2018-06-17 RX ADMIN — SODIUM CHLORIDE 2.5 MILLION UNITS: 9 INJECTION, SOLUTION INTRAVENOUS at 07:28

## 2018-06-17 RX ADMIN — WITCH HAZEL 1 PAD: 500 SOLUTION RECTAL; TOPICAL at 20:41

## 2018-06-17 RX ADMIN — DOCUSATE SODIUM 100 MG: 100 CAPSULE, LIQUID FILLED ORAL at 20:41

## 2018-06-17 RX ADMIN — SODIUM CHLORIDE 2.5 MILLION UNITS: 9 INJECTION, SOLUTION INTRAVENOUS at 03:23

## 2018-06-17 RX ADMIN — BENZOCAINE AND LEVOMENTHOL: 200; 5 SPRAY TOPICAL at 20:40

## 2018-06-17 RX ADMIN — SODIUM CHLORIDE, SODIUM LACTATE, POTASSIUM CHLORIDE, AND CALCIUM CHLORIDE 125 ML/HR: .6; .31; .03; .02 INJECTION, SOLUTION INTRAVENOUS at 09:38

## 2018-06-17 RX ADMIN — Medication 2 MILLI-UNITS/MIN: at 04:39

## 2018-06-17 NOTE — DISCHARGE INSTRUCTIONS
Vaginal Delivery   WHAT YOU NEED TO KNOW:   A vaginal delivery occurs when your baby is born through your vagina (birth canal)  DISCHARGE INSTRUCTIONS:   Seek care immediately if:   · Your leg feels warm, tender, and painful  It may look swollen and red  · You have a fever  · You are urinating very little, or not at all  · You have heavy vaginal bleeding that fills 1 or more sanitary pads in 1 hour  · You feel weak, dizzy, or faint  Contact your healthcare provider if:   · Your abdominal or perineal pain does not go away, or gets worse  · You feel depressed  · You have questions or concerns about your condition or care  Medicines:  · NSAIDs , such as ibuprofen, help decrease swelling, pain, and fever  This medicine is available with or without a doctor's order  NSAIDs can cause stomach bleeding or kidney problems in certain people  If you take blood thinner medicine, always ask your healthcare provider if NSAIDs are safe for you  Always read the medicine label and follow directions  · Stool softeners  make it easier for you to have a bowel movement  You may need this medicine to treat or prevent constipation  · Take your medicine as directed  Contact your healthcare provider if you think your medicine is not helping or if you have side effects  Tell him or her if you are allergic to any medicine  Keep a list of the medicines, vitamins, and herbs you take  Include the amounts, and when and why you take them  Bring the list or the pill bottles to follow-up visits  Carry your medicine list with you in case of an emergency  Follow up with your healthcare provider:  Most women need to return 6 weeks after a vaginal delivery  Ask your healthcare provider how to care for your wounds or stitches, if you have them  Write down your questions so you remember to ask them during your visits  Activity:  Rest as much as possible  Try to keep all activities short   You may be able to do some exercise soon after you have your baby  Talk with your healthcare provider before you start exercising  If you work outside the home, ask when you can return to your job  Kegel exercises:  Kegel exercises may help your vaginal and rectal muscles heal faster  You can do Kegel exercises by tightening and relaxing the muscles around your vagina  Kegel exercises help make the muscles stronger  Breast care:  When your milk comes in, your breasts may feel full and hard  Ask how to care for your breasts, even if you are not breastfeeding  Constipation:  You may have constipation for a period of time after you have your baby  Do not try to push the bowel movement out if it is too hard  High-fiber foods and extra liquids can help you prevent constipation  Examples of high-fiber foods are fruit and bran  Prune juice and water are good liquids to drink  You may also be told to take over-the-counter fiber and stool softener medicines  Take these items as directed  Ask how to prevent or treat hemorrhoids  Perineum care: Your perineum is the area between your vagina and anus  Keep the area clean and dry  This will help it heal and prevent infection  Wash the area gently with soap and water when you bathe or shower  Rinse your perineum with warm water after you urinate or have a bowel movement  Your healthcare provider may suggest you use a warm sitz bath to help decrease pain  To take a sitz bath, fill a bathtub with 4 to 6 inches of warm water  You may also use a sitz bath pan that fits inside the toilet  Sit in the sitz bath for 20 minutes  Do this 2 to 3 times a day, or as directed  The warm water can help decrease pain and swelling  Vaginal discharge: You will have vaginal discharge, called lochia, after your delivery  The lochia is red or dark brown with clots for 1 to 3 days after the birth  The amount will decrease and turn pale pink or brown for 3 to 10 days  It will turn white or yellow on the 10th or 14th day  Lochia is usually gone within 3 weeks  Use a sanitary pad rather than a tampon to prevent a vaginal infection  You will have lochia for up to 3 weeks after your baby is born  Monthly periods: Your period may start again within 7 to 9 weeks after your baby is born  If you are breastfeeding, it may take longer for your period to start again  You can still get pregnant again even though you do not have your monthly period  Talk with your healthcare provider about a birth control method if you do not want to get pregnant  Mood changes: Many new mothers have some kind of mood changes after delivery  Some of these changes occur because of lack of sleep, hormone changes, and caring for a new baby  Some mood changes can be more serious, such as postpartum depression  Talk with your healthcare provider if you feel unable to care for yourself or your baby  Sexual activity:  Do not have sex until your healthcare provider says it is okay  You may notice you have a decreased desire for sex, or sex may be painful  You may need to use a vaginal lubricant (gel) to help make sex more comfortable  © 2017 [facility] Information is for End User's use only and may not be sold, redistributed or otherwise used for commercial purposes  All illustrations and images included in CareNotes® are the copyrighted property of A D A M [employer] Inc  or [employer]  The above information is an  only  It is not intended as medical advice for individual conditions or treatments  Talk to your doctor, nurse or pharmacist before following any medical regimen to see if it is safe and effective for you

## 2018-06-17 NOTE — ANESTHESIA PREPROCEDURE EVALUATION
Review of Systems/Medical History  Patient summary reviewed  Chart reviewed      Cardiovascular  Negative cardio ROS    Pulmonary  Negative pulmonary ROS        GI/Hepatic  Negative GI/hepatic ROS          Negative  ROS        Endo/Other  Negative endo/other ROS      GYN  Currently pregnant , Prior pregnancy/OB history : 1 Parity: 0,          Hematology    Thrombocytopenia,    Musculoskeletal  Negative musculoskeletal ROS        Neurology  Negative neurology ROS      Psychology   Negative psychology ROS              Physical Exam    Airway    Mallampati score: I  TM Distance: <3 FB  Neck ROM: full     Dental       Cardiovascular  Comment: Negative ROS, Rhythm: regular, Rate: normal, Cardiovascular exam normal    Pulmonary  Pulmonary exam normal     Other Findings        Anesthesia Plan  ASA Score- 2     Anesthesia Type- epidural with ASA Monitors  Additional Monitors:   Airway Plan:         Plan Factors-  Patient did not smoke on day of surgery  Induction- intravenous  Postoperative Plan-     Informed Consent- Anesthetic plan and risks discussed with patient

## 2018-06-17 NOTE — ANESTHESIA PROCEDURE NOTES
Epidural Block    Patient location during procedure: OB  Start time: 6/16/2018 10:48 PM  Reason for block: procedure for pain and at surgeon's request  Staffing  Anesthesiologist: DEBO ASENCIO  Performed: anesthesiologist   Preanesthetic Checklist  Completed: patient identified, site marked, surgical consent, pre-op evaluation, timeout performed, IV checked, risks and benefits discussed and monitors and equipment checked  Epidural  Patient position: sitting  Prep: Betadine  Patient monitoring: heart rate and frequent blood pressure checks  Approach: midline  Location: lumbar (1-5)  Injection technique: BOBO air  Needle  Needle type: Tuohy   Catheter at skin depth: 10 cm  Test dose: negativenegative aspiration for CSF, negative aspiration for heme and no paresthesia on injection  patient tolerated the procedure well with no immediate complications

## 2018-06-17 NOTE — OB LABOR/OXYTOCIN SAFETY PROGRESS
Oxytocin Safety Progress Check Note - Marquise Dewitt 25 y o  female MRN: [id number]    Unit/Bed#: L&D 322-01 Encounter: [id number]    Obstetric History       T0      L0     SAB0   TAB0   Ectopic0   Multiple0   Live Births0      Gestational Age: 39w6d  Dose (regina-units/min) Oxytocin: 14 regina-units/min  Contraction Frequency (minutes): 1-3  Contraction Quality: Not applicable  Tachysystole: No   Dilation: 4-5        Effacement (%): 100  Station: -2  Baseline Rate: 130 bpm  Fetal Heart Rate: 130 BPM  FHR Category: Category I          Notes/comments:      Pt has progressed, comfortable still  Plan to continue current plan and recheck in 2 hours as needed          Bambi Sanchez DO 2018 9:08 AM

## 2018-06-17 NOTE — OB LABOR/OXYTOCIN SAFETY PROGRESS
Oxytocin Safety Progress Check Note - Alex Neighbours 25 y o  female MRN: [id number]    Unit/Bed#: L&D 322-01 Encounter: [id number]    Obstetric History       T0      L0     SAB0   TAB0   Ectopic0   Multiple0   Live Births0      Gestational Age: 39w6d  Dose (regina-units/min) Oxytocin: 16 regina-units/min  Contraction Frequency (minutes): 1-3  Contraction Quality: Not applicable  Tachysystole: No   Dilation: 5        Effacement (%): 100  Station: -2  Baseline Rate: 130 bpm  Fetal Heart Rate: 130 BPM  FHR Category: Category I          Notes/comments:      Continue titrating pitocin per Dr Tramaine Briseno  Pt comfortable, some bloody show  Strip category 1         Addy Diaz DO 2018 9:52 AM

## 2018-06-17 NOTE — DISCHARGE SUMMARY
Discharge Summary - Vidal Padilla 25 y o  female MRN: [id number]    Unit/Bed#: L&D 322-01 Encounter: [id number]    Admission Date: 2018     Discharge Date: 18    Admitting Diagnosis:   1  IUP at 40w4d  2  Rubella non-immune  3  Gestational thrombocytopenia    Discharge Diagnosis:   Same, delivered    Procedures:   spontaneous vaginal delivery    Admitting Attending: Dr Shannan Larson  Delivery Attending: Hoang Manley MD  Discharge Attending: Dr Amberly Basilio Course:     Vidal Padilla is a 25 y o  [place]rí[place]asket who was admitted at 40w4d for elective IOL  She was 2/60/-2 on presentation and received cytotec x1 dose for cervical ripening  She then received pitocin titration for induction  Her platelets were noted to be low at 95K on 18 and subsequently 81K at 18 on admission  She was diagnosed with gestational thrombocytopenia and was asymptomatic  A repeat CBC immediately after delivery calculated platelet count to be 71K and stable at 74K  She made continuous cervical change and received an epidural for pain control  She was SROM'd for clear moderate fluid at 1109  She proceeded to make cervical change and became complete at 1335  She started pushing at 1424  She then underwent an uncomplicated spontaneous vaginal delivery and delivered a viable female  at 46  APGARS were 8, 9 at 1 and 5 minutes, respectively   weighed 6lb 8oz  Patient tolerated the procedure well and was transferred to recovery in stable condition  The patient's post partum course was unremarkable  On day of discharge, she was ambulating and able to reasonably perform all ADLs  She was voiding and had appropriate bowel function  Pain was well controlled  She was discharged home on postpartum day #2 without complications  She received the MMR vaccine   Patient was instructed to follow up with her OB as an outpatient and was given appropriate warnings to call provider if she develops signs of infection or uncontrolled pain  On day of discharge she was ambulating, voiding spontaneously, tolerating oral intake and hemodynamically stable  She is breast feeding   Mom's blood type is O positive  Rhogam was not given  Condition at discharge:   good     Disposition:   Home    Planned Readmission:   No    Discharge Medications:   Prenatal vitamin daily for 6 months or the duration of nursing whichever is longer  Motrin 600 mg orally every 6 hours as needed for pain  Tylenol (over the counter) per bottle directions as needed for pain  Hydrocortisone cream 1% (over the counter) applied 1-2x daily to hemorrhoids as needed  Witch hazel pads for hemorrhoidal discomfort as needed    Discharge instructions :   -Do not place anything (no partner, tampons or douche) in your vagina for 6 weeks  -You may walk for exercise for the first 6 weeks then gradually return to your usual activities    -Please do not drive for 1 week if you have no stitches and for 2 weeks if you have stitches or underwent a  delivery     -You may take baths or shower per your preference    -Please look at your bust (breasts) in the mirror daily and call provider for redness or tenderness or increased warmth  - If you have had a  please look at your incision daily as well and call provider for increasing redness or steady drainage from the incision    -Please call your provider if temperature > 100 4*F or 38* C, worsening pain or a foul discharge      Karie Brumfield MD  OBGYN, PGY-1  2018 7:57 AM

## 2018-06-17 NOTE — OB LABOR/OXYTOCIN SAFETY PROGRESS
Labor Progress Note - Reseda Mealing 25 y o  female MRN: [id number]    Unit/Bed#: L&D 322-01 Encounter: [id number]    Obstetric History       T0      L0     SAB0   TAB0   Ectopic0   Multiple0   Live Births0      Gestational Age: 36w2d     Contraction Frequency (minutes): Irritable     Tachysystole: No   Dilation: 2        Effacement (%): 60  Station: -2     Fetal Heart Rate: 130 BPM  FHR Category: Category I          Notes/comments:   Pt comfortable currently  Cytotec #1 placed without difficulty  FHT Cat I, cont to monitor  Recheck in 1-2hr, sooner if uncomfortable    Discussed with Dr Myrtle Luis DO 2018 9:05 PM

## 2018-06-17 NOTE — OB LABOR/OXYTOCIN SAFETY PROGRESS
Oxytocin Safety Progress Check Note - Sreedhar Carroll 25 y o  female MRN: [id number]    Unit/Bed#: L&D 322-01 Encounter: [id number]    Obstetric History       T0      L0     SAB0   TAB0   Ectopic0   Multiple0   Live Births0      Gestational Age: 39w6d  Dose (regina-units/min) Oxytocin: 18 regina-units/min  Contraction Frequency (minutes): 1 5-3 5  Contraction Quality: Mild  Tachysystole: No   Dilation: 10        Effacement (%): 100  Station: 2  Baseline Rate: 145 bpm  Fetal Heart Rate: 130 BPM  FHR Category: Category I          Notes/comments:      Pt complete +2 station  Dr Jovani Khanna contacted and en-route  Pt feels urge to push intermittently  Anticipate  shortly         Mague Cook DO 2018 1:36 PM

## 2018-06-17 NOTE — ADDENDUM NOTE
Addendum  created 06/17/18 1835 by Niharika Christianson DO    Anesthesia Event edited, Anesthesia Intra LDAs edited, LDA properties accepted

## 2018-06-17 NOTE — OB LABOR/OXYTOCIN SAFETY PROGRESS
Oxytocin Safety Progress Check Note - David Grad 25 y o  female MRN: [id number]    Unit/Bed#: L&D 322-01 Encounter: [id number]    Obstetric History       T0      L0     SAB0   TAB0   Ectopic0   Multiple0   Live Births0      Gestational Age: 39w6d  Dose (regina-units/min) Oxytocin: 8 regina-units/min  Contraction Frequency (minutes): 2-3  Contraction Quality: Mild  Tachysystole: No   Dilation: 3        Effacement (%): 70  Station: -2  Baseline Rate: 135 bpm  Fetal Heart Rate: 135 BPM  FHR Category: Category I          Notes/comments:      Pt comfortable s/p epidural  Cervical exam deferred secondary to patient comfort  Will recheck in 2 hours and allow pt to rest  Dr Deep Doyle to update Dr Darius Bernstein   Tentative plan is to AROM when fetal head is well engaged on next exam        Lois Gamez DO 2018 6:58 AM

## 2018-06-17 NOTE — L&D DELIVERY NOTE
Delivery Summary - OB/GYN   Kalyn Kaplan 25 y o  female MRN: [id number]  Unit/Bed#: L&D 322-01 Encounter: [id number]    Pre-delivery Diagnosis:   1  40w5d pregnancy  2  Gestational thrombocytopenia   3  GBS positive (adequeately treated)  4 rubella non immune   5  Meconium fluid     Post-delivery Diagnosis: same    Attending: Sandra Herndon MD      Assistant(s): None present     Procedure:   Repair of 2nd degree perineal laceration    Anesthesia: epidural    Estimated Blood Loss:  350 mL    Specimens:   1  Arterial and venous cord gases  2  Cord blood  3  Segment of umbilical cord  4  Placenta to storage     Complications:  None apparent    Findings:  1  Viable female  delivered at 1549 weight pending ;  Apgar scores of 8 at one minute and 9 at five minutes  2  Spontaneous delivery of placenta with centrally  cord  3  2nd degree perineal laceration, repaired with 3-0Vicryl       Disposition: Patient tolerated the procedure well and was recovering in labor and delivery room with family and  before being transferred to the post-partum floor  Procedure Details     Description of procedure    After pushing for 1hour and 29  minutes, at 1549 patient delivered a viable male or female , weight pending , Apgars of 8 (1 min) and 9 (5 min)  The fetal vertex delivered spontaneously  There was no nuchal cord  The anterior shoulder delivered atraumatically with maternal expulsive forces and the assistance of downward traction  The posterior shoulder delivered with maternal expulsive forces and the assistance of upward traction  The remainder of the fetus delivered spontaneously  Upon delivery, the infant was placed on the mothers abdomen and the cord was clamped and cut  The infant was noted to cry spontaneously and was moving all extremities appropriately  There was no evidence for injury  Awaiting nurse resuscitators evaluated the  at bedside    As well as neonatologist as there was meconium fluid   Arterial and venous cord blood gases and cord blood was collected for analysis  These were promptly sent to the lab  In the immediate post-partum, 30 units of IV pitocin was administered and the uterus was noted to contract down well with massage and pitocin  The placenta delivered spontaneously  and was noted to have a centrally inserted  cord  The vagina, cervix, and perineum were inspected and there was noted to be second degree laceration  Laceration Repair  Patient was comfortable with epidural at that time  A 2nd degree laceration was identified and required repair  Laceration was repaired with 3-0 Vicryl in layers to reapproximate the laceration  Good hemostasis was confirmed at the conclusion of this procedure  At the conclusion of the delivery, all needle, sponge, and instrument counts were noted to be correct  Patient tolerated the procedure well and was allowed to recover in labor and delivery room with family and  before being transferred to the post-partum floor  I was present and participated in all key portions of the case

## 2018-06-17 NOTE — H&P
History & Physical - OB/GYN   Vidal Padilla 25 y o  female MRN: [id number]  Unit/Bed#: L&D 322-01 Encounter: [id number]    37 y o   at 40w4d weeks  She is a patient of Dr Annetet Valdez and Dr Tate Kulkarni    Chief complaint:  I'm here for my induction    HPI:  Contractions:  no  Fetal movement:  yes  Vaginal bleeding:   Spotting following examinations  Leaking of fluid:  no      Pregnancy Complications:  Patient Active Problem List   Diagnosis    Rubella non-immune status, antepartum    Decreased platelet count ([facility] )    40 weeks gestation of pregnancy    Encounter for supervision of normal first pregnancy in third trimester    Encounter for full-term uncomplicated delivery       PMH:  Past Medical History:   Diagnosis Date    Gestational thrombocytopenia ([facility] )     Varicella        PSH:  Past Surgical History:   Procedure Laterality Date    WISDOM TOOTH EXTRACTION         OB Hx:  Obstetric History       T0      L0     SAB0   TAB0   Ectopic0   Multiple0   Live Births0       # Outcome Date GA Lbr Rajinder/2nd Weight Sex Delivery Anes PTL Lv   1 Current                   Meds:  No current facility-administered medications on file prior to encounter  Current Outpatient Prescriptions on File Prior to Encounter   Medication Sig Dispense Refill    Prenatal MV-Min-Fe Fum-FA-DHA (PRENATAL 1 PO) Take 1 tablet by mouth daily         Allergies:  No Known Allergies    Labs:  Blood type: O+  Antibody: negative  Group B strep: positive  HIV: negative  Hepatitis B: negative  RPR: Nonreactive  Rubella: Not immune  Varicella Unknown  1 hour Glucose: 93    Physical Exam:  LMP 2017 (Exact Date)       Gen:  AaOx3, NAD, pleasant  Pulm: No increased work of breathing  Abd: Gravid, nontender  Extremities: No edema, nontender    Estimated Fetal Weight: 7 lbs  Presentation: Vertex, confirmed via ultrasound    SVE: 2 / 60% / -2  FHT:  135 / Moderate 6 - 25 bpm / +accels, -decels  [place]: Occasional irritability    Membranes: Intact    Assessment:   25 y o   at 40w4d weeks, elective IOL    Plan:   1  Admit to L&D  2  CBC, RPR, type and screen  3  Clear liquid diet, IV fluids  4  Cytotec for cervical ripening  5  Gestational thrombocytopenia: Platelets at last exam 95k, will repeat now  6  Rubella nonimmune: Will be immunized postpartum  7  Epidural upon request    Discussed with Dr Creig Halsted Ernst Hazy, DO  OB/GYN, PGY2  2018, 8:53 PM

## 2018-06-18 LAB
BASOPHILS # BLD AUTO: 0.02 THOUSANDS/ΜL (ref 0–0.1)
BASOPHILS NFR BLD AUTO: 0 % (ref 0–1)
EOSINOPHIL # BLD AUTO: 0.06 THOUSAND/ΜL (ref 0–0.61)
EOSINOPHIL NFR BLD AUTO: 0 % (ref 0–6)
ERYTHROCYTE [DISTWIDTH] IN BLOOD BY AUTOMATED COUNT: 14 % (ref 11.6–15.1)
HCT VFR BLD AUTO: 33.2 % (ref 34.8–46.1)
HGB BLD-MCNC: 11.1 G/DL (ref 11.5–15.4)
LYMPHOCYTES # BLD AUTO: 2.86 THOUSANDS/ΜL (ref 0.6–4.47)
LYMPHOCYTES NFR BLD AUTO: 20 % (ref 14–44)
MCH RBC QN AUTO: 30.3 PG (ref 26.8–34.3)
MCHC RBC AUTO-ENTMCNC: 33.4 G/DL (ref 31.4–37.4)
MCV RBC AUTO: 91 FL (ref 82–98)
MONOCYTES # BLD AUTO: 1.99 THOUSAND/ΜL (ref 0.17–1.22)
MONOCYTES NFR BLD AUTO: 14 % (ref 4–12)
NEUTROPHILS # BLD AUTO: 9.14 THOUSANDS/ΜL (ref 1.85–7.62)
NEUTS SEG NFR BLD AUTO: 65 % (ref 43–75)
NRBC BLD AUTO-RTO: 0 /100 WBCS
PLATELET # BLD AUTO: 74 THOUSANDS/UL (ref 149–390)
PMV BLD AUTO: 10.3 FL (ref 8.9–12.7)
RBC # BLD AUTO: 3.66 MILLION/UL (ref 3.81–5.12)
WBC # BLD AUTO: 14.07 THOUSAND/UL (ref 4.31–10.16)

## 2018-06-18 PROCEDURE — 85025 COMPLETE CBC W/AUTO DIFF WBC: CPT | Performed by: OBSTETRICS & GYNECOLOGY

## 2018-06-18 PROCEDURE — 90707 MMR VACCINE SC: CPT | Performed by: OBSTETRICS & GYNECOLOGY

## 2018-06-18 RX ADMIN — IBUPROFEN 600 MG: 600 TABLET ORAL at 15:09

## 2018-06-18 RX ADMIN — MEASLES, MUMPS, AND RUBELLA VIRUS VACCINE LIVE 0.5 ML: 1000; 12500; 1000 INJECTION, POWDER, LYOPHILIZED, FOR SUSPENSION SUBCUTANEOUS at 08:46

## 2018-06-18 RX ADMIN — IBUPROFEN 600 MG: 600 TABLET ORAL at 06:05

## 2018-06-18 RX ADMIN — Medication 1 TABLET: at 08:40

## 2018-06-18 RX ADMIN — DOCUSATE SODIUM 100 MG: 100 CAPSULE, LIQUID FILLED ORAL at 17:53

## 2018-06-18 RX ADMIN — DOCUSATE SODIUM 100 MG: 100 CAPSULE, LIQUID FILLED ORAL at 08:40

## 2018-06-18 NOTE — PROGRESS NOTES
Progress Note - OB/GYN   Sebastian Fajardo 25 y o  female MRN: [id number]  Unit/Bed#: L&D 315-01 Encounter: [id number]    Assessment:  Term pregnancy, s/p , PPD#1  Thrombocytopenia  Rubella non-immune    Plan:  : PPD#1  Cont routine postpartum care  Encourage ambulation  Encourage increased PO water intake  Pain control as needed  Thrombocytopenia: Gestational vs  ITP  Admission Plt 81, postpartum 71  Will repeat CBC today at noon and if continuing to fall will consult hematology  Rubella non-immune: MMR ordered    Subjective/Objective   Chief Complaint: I'm okay    Subjective: Pt reports feeling well today  Ambulating  Tolerating PO  Voiding without difficulty  No BM yet but passing flatus  Lochia improving  Denies HA, CP, SOB, extremity pain  Objective:   Vitals: Blood pressure 110/74, pulse 60, temperature 97 9 °F (36 6 °C), temperature source Oral, resp  rate 17, last menstrual period 2017, SpO2 96 %, currently breastfeeding  ,There is no height or weight on file to calculate BMI  Intake/Output Summary (Last 24 hours) at 18 0649  Last data filed at 18 2300   Gross per 24 hour   Intake             1000 ml   Output             1750 ml   Net             -750 ml       Invasive Devices     Peripheral Intravenous Line            Peripheral IV 18 Left Hand 1 day                Physical Exam:   Gen: AaOx3, NAD, pleasant  Pulm: No increased work of breathing  Abd: Soft, nontender, nondistended  : Fundus firm, appropriately tender, located at umbilicus  Extremities: No edema, nontender, Negative Anabel's bilaterally      Lab, Imaging and other studies: I have personally reviewed pertinent reports              Carlton Peñaloza DO  OB/GYN, PGY2  2018, 6:53 AM

## 2018-06-18 NOTE — PLAN OF CARE

## 2018-06-18 NOTE — SOCIAL WORK
CM met with MOB and FOB, Dede Carrero, to do a general SW assessment  MOB gave birth to a baby girl, Alecia Parisian  Parents  and live together  No other children in the home  Report having all necessary items for baby at discharge  MOB is breastfeeding, request Medela Pump - rx sent to [facility] and pump provided today  Family plans to use [facility] at discharge  Parents drive  PNC provided through [employer]  Parents have a lot of family support to help with infant care at discharge  They are not eligible for [facility] services  Baby will be enrolled in [address] aware to let HR know within 30 days of delivery to avoid gaps in care  No metal health hx reported  No social concerns identified

## 2018-06-18 NOTE — LACTATION NOTE
This note was copied from a baby's chart  CONSULT - LACTATION  Baby Girl  Julisa Shannon 1 days female MRN: [id number]    [facility] [address] NURSERY Room / Bed: L&D 315(N)/L&D 315(N) Encounter: [id number]    Maternal Information     MOTHER:  India Shannon  Maternal Age: 25 y o    OB History: #: 1, Date: 18, Sex: Female, Weight: 2948 g (6 lb 8 oz), GA: 40w5d, Delivery: Vaginal, Spontaneous Delivery, Apgar1: 8, Apgar5: 9, Living: Living, Birth Comments: None   Previouse breast reduction surgery? No    Lactation history:   Has patient previously breast fed: No   How long had patient previously breast fed:     Previous breast feeding complications:       Past Surgical History:   Procedure Laterality Date    WISDOM TOOTH EXTRACTION         Birth information:  YOB: 2020   Time of birth: 3:49 PM   Sex: female   Delivery type: Vaginal, Spontaneous Delivery   Birth Weight: 2948 g (6 lb 8 oz)   Percent of Weight Change: 0%     Gestational Age: 39w6d   [unfilled]    Assessment     Breast and nipple assessment: did not assess at this time     Assessment: did not assess at this time    Feeding assessment: feeding well  LATCH:  Latch: Audible Swallowing:     Type of Nipple:     Comfort (Breast/Nipple):     Hold (Positioning):     LATCH Score:            Feeding recommendations:  breast feed on demand     Met with mother  Provided mother with Ready, Set, Baby booklet  Discussed Skin to Skin contact an benefits to mom and baby  Talked about the delay of the first bath until baby has adjusted  Spoke about the benefits of rooming in  Feeding on cue and what that means for recognizing infant's hunger  Avoidance of pacifiers for the first month discussed  Talked about exclusive breastfeeding for the first 6 months  Positioning and latch reviewed as well as showing images of other feeding positions  Discussed the properties of a good latch in any position  Reviewed hand/manual expression  Discussed s/s that baby is getting enough milk and some s/s that breastfeeding dyad may need further help  Gave information on common concerns, what to expect the first few weeks after delivery, preparing for other caregivers, and how partners can help  Resources for support also provided  Mother verbalized breastfeeding is going better  Enc to call for assistance as needed,phone # given      Yessi Roldan RN 6/18/2018 8:20 AM

## 2018-06-19 VITALS
OXYGEN SATURATION: 98 % | SYSTOLIC BLOOD PRESSURE: 120 MMHG | DIASTOLIC BLOOD PRESSURE: 80 MMHG | HEART RATE: 63 BPM | TEMPERATURE: 98.1 F | RESPIRATION RATE: 17 BRPM

## 2018-06-19 RX ORDER — ACETAMINOPHEN 325 MG/1
650 TABLET ORAL EVERY 6 HOURS PRN
Qty: 30 TABLET | Refills: 0
Start: 2018-06-19 | End: 2018-07-30

## 2018-06-19 RX ORDER — IBUPROFEN 600 MG/1
600 TABLET ORAL EVERY 6 HOURS PRN
Qty: 30 TABLET | Refills: 0
Start: 2018-06-19 | End: 2018-07-30

## 2018-06-19 RX ORDER — DIAPER,BRIEF,INFANT-TODD,DISP
1 EACH MISCELLANEOUS AS NEEDED
Qty: 30 G | Refills: 0
Start: 2018-06-19 | End: 2018-07-30

## 2018-06-19 RX ORDER — SIMETHICONE 80 MG
80 TABLET,CHEWABLE ORAL 4 TIMES DAILY PRN
Qty: 30 TABLET | Refills: 0
Start: 2018-06-19 | End: 2018-07-30

## 2018-06-19 RX ORDER — DOCUSATE SODIUM 100 MG/1
100 CAPSULE, LIQUID FILLED ORAL 2 TIMES DAILY
Qty: 10 CAPSULE | Refills: 0
Start: 2018-06-19 | End: 2018-07-30

## 2018-06-19 RX ADMIN — Medication 1 TABLET: at 08:36

## 2018-06-19 RX ADMIN — DOCUSATE SODIUM 100 MG: 100 CAPSULE, LIQUID FILLED ORAL at 08:36

## 2018-06-19 RX ADMIN — IBUPROFEN 600 MG: 600 TABLET ORAL at 06:09

## 2018-06-19 NOTE — LACTATION NOTE
This note was copied from a baby's chart  Met with mother to go over feeding log since birth for the first week  Emphasized 8 or more (12) feedings in a 24 hour period, what to expect for the number of diapers per day of life and the progression of properties of the  stooling pattern  Discussed s/s that breastfeeding is going well after day 4 and when to get help from a pediatrician or lactation support person after day 4  Booklet included Breast Pumping Instructions, When You Go Back to Work or School, and Breastfeeding Resources for after discharge including access to the number for the [employer]  Father of baby present and received education  Mother verbalized breastfeeding is going well  Enc to call for assistance as needed,phone # given

## 2018-06-19 NOTE — CASE MANAGEMENT
Notification of Birth and  Information  This is a Notification of Birth and [place] Information to our facility [address] [place] [address]  Please be advised that this patient is currently in our facility under Inpatient Status  Below you will find the Birth/ Summary, Attending Physician and Facilitys information including NPI# and contact for the Utilization  assigned to the [facility] where the patient is receiving services  Please feel free to contact the Utilization Review Department with any questions  Mothers Information:  Michelle Fowler  MRN: [id number]  YOB: 1990  Admission Date: 2018  7:49 PM  Discharge Date: 2018 11:48 AM  Disposition: Home/Self Care  Admitting Diagnosis: Encounter for full-term uncomplicated delivery [J59]   Information:  Estimated Date of Delivery: 18  Information for the patient's :  Joceline Marsha [[id number]]      Delivery Information:  Sex: female  Delivered 2018 3:49 PM by Vaginal, Spontaneous Delivery; Gestational Age: 39w6d     Measurements:  Weight: 6 lb 8 oz (2948 g); Height: 20"    APGAR 1 minute 5 minutes 10 minutes   Totals: 8 9      OB History      Para Term  AB Living    1 1 1     1    SAB TAB Ectopic Multiple Live Births          0 1        Attending Physician:  CEELNA Colmenares  Specialty- Obstetrics and Gynecology  [facility] ID- [id number]  [address]  Phone 1: [phone]  Fax: [phone]    Facility: [address] [place] [address]  [phone]  Tax ID: [id number]  NPI: [id number]    [facility] [place] [place] in the [facility] by Federico Virk for 2017  Network Utilization Review Department  Phone: [phone];  Fax [phone]  ATTENTION: The Network Utilization Review Department is now centralized for our 7 Facilities  Make a note that we have a new phone and fax numbers for our Department  Please call with any questions or concerns to [phone] and carefully follow the prompts so that you are directed to the right person  All voicemails are confidential  Fax any determinations, approvals, denials, and requests for initial or continue stay review clinical to [phone]  Due to HIGH CALL volume, it would be easier if you could please send faxed requests to expedite your requests and in part, help us provide discharge notifications faster

## 2018-06-19 NOTE — PROGRESS NOTES
Progress Note - OB/GYN   Amy North 25 y o  female MRN: [id number]  Unit/Bed#: L&D 315-01 Encounter: [id number]    Assessment:  Term pregnancy, s/p , PPD#2  Thrombocytopenia  Rubella non-immune    Plan:  : PPD#2  Cont routine postpartum care  Encourage ambulation  Encourage increased PO water intake  Pain control as needed  Thrombocytopenia: Gestational vs  ITP  Admission Plt 81, postpartum 71-->74, stable  Rubella non-immune: s/p MMR  Dispo: VSS, d/c home today    Subjective/Objective   Chief Complaint: I'm okay    Subjective: Pt reports feeling well today  Ambulating  Tolerating PO  Voiding without difficulty  No BM yet but passing flatus  Lochia improving  Denies HA, CP, SOB, extremity pain  Objective:   Vitals: Blood pressure 120/80, pulse 63, temperature 98 1 °F (36 7 °C), temperature source Oral, resp  rate 17, last menstrual period 2017, SpO2 98 %, currently breastfeeding  ,There is no height or weight on file to calculate BMI  No intake or output data in the 24 hours ending 18 0749    Invasive Devices          No matching active lines, drains, or airways          Physical Exam:   Gen: AaOx3, NAD, pleasant  Pulm: No increased work of breathing  Abd: Soft, nontender, nondistended  : Fundus firm, appropriately tender, located at umbilicus  Extremities: No edema, nontender, Negative Anabel's bilaterally      Lab, Imaging and other studies: I have personally reviewed pertinent reports              Kirstin Barkley MD  OBGYN, PGY-1  2018 7:50 AM

## 2018-06-20 ENCOUNTER — TELEPHONE (OUTPATIENT)
Dept: OBGYN CLINIC | Facility: MEDICAL CENTER | Age: 28
End: 2018-06-20

## 2018-06-21 ENCOUNTER — TELEPHONE (OUTPATIENT)
Dept: OBGYN CLINIC | Facility: MEDICAL CENTER | Age: 28
End: 2018-06-21

## 2018-06-21 NOTE — TELEPHONE ENCOUNTER
DELIVERY DATE:2018    TYPE OF DELIVERY:    DOCTOR AT DELIVERY: Meagan    APGARS: 8,9    BREAST OR BOTTLE FEEDING:Breast    BLEEDING: Minimal    INCISION OR REPAIRS: 2nd degree laceration    PAIN: Denies    FEVER OR CHILLS: Denies

## 2018-07-05 LAB — PLACENTA IN STORAGE: NORMAL

## 2018-07-30 ENCOUNTER — POSTPARTUM VISIT (OUTPATIENT)
Dept: OBGYN CLINIC | Facility: MEDICAL CENTER | Age: 28
End: 2018-07-30

## 2018-07-30 VITALS — BODY MASS INDEX: 27.26 KG/M2 | WEIGHT: 153.9 LBS | DIASTOLIC BLOOD PRESSURE: 64 MMHG | SYSTOLIC BLOOD PRESSURE: 92 MMHG

## 2018-07-30 DIAGNOSIS — Z3A.40 40 WEEKS GESTATION OF PREGNANCY: ICD-10-CM

## 2018-07-30 DIAGNOSIS — Z86.2 H/O THROMBOCYTOPENIA: ICD-10-CM

## 2018-07-30 PROCEDURE — 99024 POSTOP FOLLOW-UP VISIT: CPT | Performed by: OBSTETRICS & GYNECOLOGY

## 2018-07-30 NOTE — PROGRESS NOTES
OB POSTPARTUM VISIT PROGRESS NOTE  Date of Encounter: 2018    Ebony [place]    : 1990  (25 y o )  MR: [id number]    Subjective   Aundrea [place] is in for her postpartum visit  She is now   She delivered by vacuum-assisted vaginal delivery  She's generally doing well, denies current pain or bleeding issues, and has no significant depression issues  She is breast feeding exclusively  We discussed all appropriate contraceptive options and she chooses None  Objective   EXAM:  GENERAL: alert, well appearing, and in no distress  VITALS: Blood pressure 92/64, weight 69 8 kg (153 lb 14 4 oz), last menstrual period 2017, currently breastfeeding  BMI: Body mass index is 27 26 kg/m²  BREASTS: Deferred  ABDOMEN: Regular/ soft depressible non tender   EXTREMITIES: All normal   PELVIC:   VULVA: normal    VAGINA: Normal, no discharge  Introitus well healed, slightly tender  CERVIX: Normal, no discharge  Nontender  UTERUS: Anteverted, NSSC, [place], NT    RIGHT ADNEXUM: Nontender, no mass  LEFT ADNEXUM: Nontender, no mass  RECTAL: Deferred      Assessment/Plan   Diagnoses and all orders for this visit:    6 weeks postpartum follow-up     (spontaneous vaginal delivery)    40 weeks gestation of pregnancy    H/O thrombocytopenia  -     CBC and differential        Bouchra Tinajero MD

## 2018-08-20 ENCOUNTER — TRANSCRIBE ORDERS (OUTPATIENT)
Dept: ADMINISTRATIVE | Facility: HOSPITAL | Age: 28
End: 2018-08-20

## 2018-08-20 ENCOUNTER — APPOINTMENT (OUTPATIENT)
Dept: LAB | Facility: MEDICAL CENTER | Age: 28
End: 2018-08-20
Payer: COMMERCIAL

## 2018-08-20 DIAGNOSIS — Z00.8 ENCOUNTER FOR OTHER GENERAL EXAMINATION: ICD-10-CM

## 2018-08-20 DIAGNOSIS — Z00.8 ENCOUNTER FOR OTHER GENERAL EXAMINATION: Primary | ICD-10-CM

## 2018-08-20 LAB
BASOPHILS # BLD AUTO: 0.01 THOUSANDS/ΜL (ref 0–0.1)
BASOPHILS NFR BLD AUTO: 0 % (ref 0–1)
CHOLEST SERPL-MCNC: 135 MG/DL (ref 50–200)
EOSINOPHIL # BLD AUTO: 0.03 THOUSAND/ΜL (ref 0–0.61)
EOSINOPHIL NFR BLD AUTO: 1 % (ref 0–6)
ERYTHROCYTE [DISTWIDTH] IN BLOOD BY AUTOMATED COUNT: 12.1 % (ref 11.6–15.1)
EST. AVERAGE GLUCOSE BLD GHB EST-MCNC: 74 MG/DL
HBA1C MFR BLD: 4.2 % (ref 4.2–6.3)
HCT VFR BLD AUTO: 40.1 % (ref 34.8–46.1)
HDLC SERPL-MCNC: 61 MG/DL (ref 40–60)
HGB BLD-MCNC: 12.8 G/DL (ref 11.5–15.4)
IMM GRANULOCYTES # BLD AUTO: 0.01 THOUSAND/UL (ref 0–0.2)
IMM GRANULOCYTES NFR BLD AUTO: 0 % (ref 0–2)
LDLC SERPL CALC-MCNC: 64 MG/DL (ref 0–100)
LYMPHOCYTES # BLD AUTO: 2.06 THOUSANDS/ΜL (ref 0.6–4.47)
LYMPHOCYTES NFR BLD AUTO: 43 % (ref 14–44)
MCH RBC QN AUTO: 28.3 PG (ref 26.8–34.3)
MCHC RBC AUTO-ENTMCNC: 31.9 G/DL (ref 31.4–37.4)
MCV RBC AUTO: 89 FL (ref 82–98)
MONOCYTES # BLD AUTO: 0.76 THOUSAND/ΜL (ref 0.17–1.22)
MONOCYTES NFR BLD AUTO: 16 % (ref 4–12)
NEUTROPHILS # BLD AUTO: 1.91 THOUSANDS/ΜL (ref 1.85–7.62)
NEUTS SEG NFR BLD AUTO: 40 % (ref 43–75)
NONHDLC SERPL-MCNC: 74 MG/DL
NRBC BLD AUTO-RTO: 0 /100 WBCS
PLATELET # BLD AUTO: 80 THOUSANDS/UL (ref 149–390)
PMV BLD AUTO: 11.1 FL (ref 8.9–12.7)
RBC # BLD AUTO: 4.52 MILLION/UL (ref 3.81–5.12)
TRIGL SERPL-MCNC: 49 MG/DL
WBC # BLD AUTO: 4.78 THOUSAND/UL (ref 4.31–10.16)

## 2018-08-20 PROCEDURE — 85025 COMPLETE CBC W/AUTO DIFF WBC: CPT | Performed by: OBSTETRICS & GYNECOLOGY

## 2018-08-20 PROCEDURE — 36415 COLL VENOUS BLD VENIPUNCTURE: CPT

## 2018-08-20 PROCEDURE — 80061 LIPID PANEL: CPT

## 2018-08-20 PROCEDURE — 83036 HEMOGLOBIN GLYCOSYLATED A1C: CPT

## 2018-08-21 NOTE — PROGRESS NOTES
Please inform patient  CBC still showing low platelet count , I do recommend repeat appointment with Hematology for follow up thrombocytopenia

## 2018-10-03 ENCOUNTER — OFFICE VISIT (OUTPATIENT)
Dept: POSTPARTUM | Facility: CLINIC | Age: 28
End: 2018-10-03
Payer: COMMERCIAL

## 2018-10-03 VITALS — SYSTOLIC BLOOD PRESSURE: 100 MMHG | DIASTOLIC BLOOD PRESSURE: 70 MMHG

## 2018-10-03 DIAGNOSIS — O92.70 LACTATION PROBLEM: ICD-10-CM

## 2018-10-03 DIAGNOSIS — Z71.89 ENCOUNTER FOR BREAST FEEDING COUNSELING: Primary | ICD-10-CM

## 2018-10-03 PROCEDURE — 99403 PREV MED CNSL INDIV APPRX 45: CPT | Performed by: PEDIATRICS

## 2018-10-03 NOTE — PATIENT INSTRUCTIONS
Continue to offer the breast at feeding cues  Switching back and forth between breasts can help during times when Zandra appears to fuss while nursing  Feed at least every 6 hours at night  If Zandra does not wake up, pump to help protect your supply  Cycle your pump through stimulation and expression mode several times in a session to stimulate several let downs  You can offer your expressed milk to Zandra at times when she fusses and is not content at the breast if you like  Rule out pregnancy as a cause of decreased milk supply  Please call with any questions or concerns

## 2018-10-03 NOTE — PROGRESS NOTES
INITIAL BREAST FEEDING EVALUATION    Informant/Relationship: Rula Damon    Discussion of General Lactation Issues: Rula Damon is concerned that her supply has dropped  For the past few weeks, she feels her breasts are less full as the day goes on  Zandra will frequently pull and fuss while nursing in the evening  Recently Zandra's growth has slowed a little as well  Of note, Zandra sleeps for six hours over night and Rula Damon got her first postpartum menses at about 12 weeks  Infant is 1 months old today   History:  Fertility Problem:yes - Rula Damon and her  had just started testing when she got pregnant  They had been trying for about a year  Breast changes:yes - breasts got larger by 2 cup sizes  : yes - induced due to maternal medical hx  Full term:yes - almost 41 weeks   labor:no  First nursing/attempt < 1 hour after birth:yes - baby did not latch or nurse  First successful feeding at about 4-5 hours  Skin to skin following delivery:yes - delayed by about 15 minutes due to meconium aspiration  Breast changes after delivery:yes - milk came in at about a week  Rooming in (infant in room with mother with exception of procedures, eg  Circumcision: yes - only left for testing  Blood sugar issues:no  NICU stay:no  Jaundice:no  Phototherapy:no  Supplement given: (list supplement and method used as well as reason(s):no    Past Medical History:   Diagnosis Date    Gestational thrombocytopenia ([facility] [address] )     Varicella        No current outpatient prescriptions on file  No Known Allergies    History   Drug Use No       Social History Never a smoker    Interval Breastfeeding History:    Frequency of breast feeding: Every 2-3 hours during the day  Sleeps 6-10 hours at night    Does mother feel breastfeeding is effective: No sometimes fusses at the breast  Does infant appear satisfied after nursing:No not in the evenings  Stooling pattern normal: Yes  Urinating frequently:Yes  Using shield or shells: No    Alternative/Artificial Feedings:   Bottle: No  Cup: No  Syringe/Finger: No           Formula Type: none                     Amount: n/a            Breast Milk:                      Amount: none            Frequency Q 2-3 Hr between feedings  Elimination Problems: No      Equipment:  Nipple Shield             Type: none             Size: n/a             Frequency of Use: n/a  Pump            Type: Medela Pump in Style            Frequency of Use: none currently  Shells            Type: none            Frequency of use: n/a    Equipment Problems: no    Mom:  Breast: Medium sized symmetrical breasts  Nipple Assessment in General: Normal: elongated/eraser, no discoloration and no damage noted  Mother's Awareness of Feeding Cues                 Recognizes: Yes                  Verbalizes: Yes  Support System: FOB  History of Breastfeeding: none  Changes/Stressors/Violence: Ellyn Connors is concerned about Melvins growth and about her milk supply  Concerns/Goals: Eve Perrin wants to continue breastfeeding and wants Zandra to grow appropriately    Problems with Mom: Concerns with supply    Physical Exam   Constitutional: She is oriented to person, place, and time  She appears well-developed and well-nourished  HENT:   Head: Normocephalic  Neck: Normal range of motion  Cardiovascular: Normal rate, regular rhythm and normal heart sounds  Pulmonary/Chest: Effort normal and breath sounds normal    Musculoskeletal: Normal range of motion  Neurological: She is alert and oriented to person, place, and time  Skin: Skin is warm and dry  Psychiatric: She has a normal mood and affect   Her behavior is normal  Judgment and thought content normal        Infant:  Behaviors: Alert  Color: Pink  Birth weight: 2948gram  Current weight: 5485gram    Problems with infant: slowed growth, fussy at the breast in the evenings      General Appearance:  Alert, active, no distress                            Head: Normocephalic                            Eyes:   Conjunctiva clear, no drainage                            Ears:   Normally placed, no anomolies                           Nose:   no drainage or erythema                          Mouth:  No lesions  Normal oral anatomy and function                   Neck:  Supple, symmetrical, trachea midline                Respiratory:  No grunting, flaring, retractions, breath sounds clear and equal           Cardiovascular:  Regular rate and rhythm  No murmur  Adequate perfusion/capillary refill  Abdomen:    Soft, non-tender, no masses, bowel sounds present, no HSM            Genitourinary:  Normal female genitalia                         Spine:   No abnormalities noted       Musculoskeletal:   Full range of motion         Skin/Hair/Nails:   Skin warm, dry, and intact, no rashes or abnormal dyspigmentation or lesions               Neurologic:   No abnormal movement, tone appropriate    [place] Latch:  Efficiency:               Lips Flanged: Yes              Depth of latch: fair              Audible Swallow: Yes              Visible Milk: Yes              Wide Open/ Asymmetrical: Yes, after repositioning              Suck Swallow Cycle: Breathing: unlabored, Coordinated: yes  Nipple Assessment after latch: Normal: elongated/eraser, no discoloration and no damage noted  Latch Problems: Initial latch was shallow due to Zandra's positioning  After discussion and demonstration, a deeper latch was achieved  Zandra was not very hungry and was also very distracted during the feeding so she only nursed briefly on each breast     Position:  Infant's Ergonomics/Body               Body Alignment: No, her body was turned away from her mother's               Head Supported: Yes               Close to Mom's body/ Lifted/ Supported: Yes               Mom's Ergonomics/Body: Yes                           Supported:  Yes                           Sitting Back: Yes Brings Baby to her breast: Yes  Positioning Problems: Zandra was initially seated in [place]'s lap with her body in mom's lap but rotated outward  I encouraged Marino Brunner to turn Zandra's entire body to face hers and to pull her close to help achieve a better latch      Handouts:   Hands on pumping, Hand expression and Latch check list, Increasing your supply    Education:  Reviewed Latch: Demonstrated how to gently compress the breast and align the baby so that his nose is just above the nipple with his lower lip and chin touching the breast to encourage the deepest, widest, off-center latch  Reviewed Positioning for Dyad: Demonstrated how to position baby "belly to belly" with mom with her ear, shoulder and hip in alignment  Reviewed Frequency/Supply & Demand: Discussed how frequently and effectively emptying the breasts by nursing or pumping will help maintain a healthy milk supply  Reviewed Infant:Cues and varied States of Awareness  Reviewed Equipment: Discussed the use and features of the Medela Pump in Style and the elements of hands on pumping  Continue to offer the breast at feeding cues  Switching back and forth between breasts can help during times when Zandra appears to fuss while nursing  Feed at least every 6 hours at night  If Zandra does not wake up, pump to help protect your supply  Cycle your pump through stimulation and expression mode several times in a session to stimulate several let downs  You can offer your expressed milk to Zandra at times when she fusses and is not content at the breast if you like  Rule out pregnancy as a cause of decreased milk supply  Please call with any questions or concerns  I have spent 45 minutes with Patient and family today in which greater than 50% of this time was spent in counseling/coordination of care regarding Patient and family education

## 2018-10-08 NOTE — PROGRESS NOTES
I have reviewed the notes, assessments, and/or procedures performed by Ruma Jaime, RN, IBCLC, I concur with her/his documentation of Constance Brunner

## 2018-11-06 ENCOUNTER — ANNUAL EXAM (OUTPATIENT)
Dept: OBGYN CLINIC | Facility: MEDICAL CENTER | Age: 28
End: 2018-11-06
Payer: COMMERCIAL

## 2018-11-06 VITALS
BODY MASS INDEX: 25.1 KG/M2 | WEIGHT: 147 LBS | SYSTOLIC BLOOD PRESSURE: 104 MMHG | DIASTOLIC BLOOD PRESSURE: 70 MMHG | HEIGHT: 64 IN

## 2018-11-06 DIAGNOSIS — Z01.419 ENCNTR FOR GYN EXAM (GENERAL) (ROUTINE) W/O ABN FINDINGS: Primary | ICD-10-CM

## 2018-11-06 DIAGNOSIS — Z30.011 ENCOUNTER FOR BCP (BIRTH CONTROL PILLS) INITIAL PRESCRIPTION: ICD-10-CM

## 2018-11-06 PROCEDURE — 99395 PREV VISIT EST AGE 18-39: CPT | Performed by: NURSE PRACTITIONER

## 2018-11-06 RX ORDER — ACETAMINOPHEN AND CODEINE PHOSPHATE 120; 12 MG/5ML; MG/5ML
1 SOLUTION ORAL DAILY
Qty: 84 TABLET | Refills: 3 | Status: SHIPPED | OUTPATIENT
Start: 2018-11-06

## 2018-11-06 NOTE — PROGRESS NOTES
ASSESSMENT & PLAN: Maria L Yadav is a 25 y o  Anne Darling with normal gynecologic exam     1   Routine well woman exam done today  2  Pap and HPV:  The patient's last pap and hpv was   It was normal     Pap and cotesting was not done today  Current ASCCP Guidelines reviewed  3   The following were reviewed in today's visit: breast self exam, family planning choices, adequate intake of calcium and vitamin D, exercise and healthy diet  4  rx for ocp sent to pharmacy  Will call if stops breastfeeding to be changed to estrogen/progesterone ocp  CC:  Annual Gynecologic Examination    HPI: Maria L Yadav is a 25 y o  Anne Darling who presents for annual gynecologic examination  She has the following concerns: none, breastfeeding 11 month old w/o problems  She will be moving to [facility] w  who is a er resident and has a job there  Health Maintenance:    She wears her seatbelt routinely  She does perform regular monthly self breast exams  She feels safe at home  Past Medical History:   Diagnosis Date    Gestational thrombocytopenia ([address] )     Varicella        Past Surgical History:   Procedure Laterality Date    WISDOM TOOTH EXTRACTION         Past OB/Gyn History:  OB History      Para Term  AB Living    1 1 1     1    SAB TAB Ectopic Multiple Live Births          0 1        Pt does not have menstrual issues  History of sexually transmitted infection: No   History of abnormal pap smears: No      Patient is currently sexually active  heterosexual   The current method of family planning is condoms     Family History   Problem Relation Age of Onset    Hypertension Mother     Hypertension Father        Social History:  Social History     Social History    Marital status: /Civil Union     Spouse name: N/A    Number of children: N/A    Years of education: N/A     Occupational History    Not on file       Social History Main Topics    Smoking status: Never Smoker    Smokeless tobacco: Never Used    Alcohol use No    Drug use: No    Sexual activity: Yes     Partners: Male     Birth control/ protection: Coitus interruptus, None     Other Topics Concern    Not on file     Social History Narrative    No narrative on file     Presently lives with spouse and daughter  Patient is   Patient is currently unemployed     No Known Allergies      Current Outpatient Prescriptions:     Prenatal Vit-Fe Fumarate-FA (PRENATAL VITAMIN PO), Take 1 tablet by mouth daily, Disp: , Rfl:       Review of Systems  Constitutional :no fever, feels well, no tiredness, no recent weight gain or loss  ENT: no ear ache, no loss of hearing, no nosebleeds or nasal discharge, no sore throat or hoarseness  Cardiovascular: no complaints of slow or fast heart beat, no chest pain, no palpitations, no leg claudication or lower extremity edema  Respiratory: no complaints of shortness of shortness of breath, no SIMMONS  Breasts:no complaints of breast pain, breast lump, or nipple discharge  Gastrointestinal: no complaints of abdominal pain, constipation, nausea, vomiting, or diarrhea or bloody stools  Genitourinary : no complaints of dysuria, incontinence, pelvic pain, no dysmenorrhea, vaginal discharge or abnormal vaginal bleeding and as noted in HPI  Musculoskeletal: no complaints of arthralgia, no myalgia, no joint swelling or stiffness, no limb pain or swelling    Integumentary: no complaints of skin rash or lesion, itching or dry skin  Neurological: no complaints of headache, no confusion, no numbness or tingling, no dizziness or fainting    Objective      /70   Ht 5' 4" (1 626 m)   Wt 66 7 kg (147 lb)   LMP 10/06/2018   BMI 25 23 kg/m²   General:   appears stated age, cooperative, alert normal mood and affect   Neck: normal, supple,trachea midline, no masses   Heart: regular rate and rhythm, S1, S2 normal, no murmur, click, rub or gallop   Lungs: clear to auscultation bilaterally   Breasts: normal appearance, no masses or tenderness   Abdomen: soft, non-tender, without masses or organomegaly   Vulva: normal   Vagina: normal vagina   Urethra: normal   Cervix: Normal, no discharge  Uterus: normal size, contour, position, consistency, mobility, non-tender   Adnexa: normal adnexa   Lymphatic palpation of lymph nodes in neck, axilla, groin and/or other locations: no lymphadenopathy or masses noted   Skin normal skin turgor and no rashes     Psychiatric orientation to person, place, and time: normal  mood and affect: normal

## 2018-12-04 ENCOUNTER — OFFICE VISIT (OUTPATIENT)
Dept: FAMILY MEDICINE CLINIC | Facility: CLINIC | Age: 28
End: 2018-12-04
Payer: COMMERCIAL

## 2018-12-04 VITALS
WEIGHT: 142 LBS | OXYGEN SATURATION: 98 % | DIASTOLIC BLOOD PRESSURE: 68 MMHG | BODY MASS INDEX: 25.16 KG/M2 | RESPIRATION RATE: 16 BRPM | SYSTOLIC BLOOD PRESSURE: 104 MMHG | HEIGHT: 63 IN | TEMPERATURE: 97.9 F | HEART RATE: 64 BPM

## 2018-12-04 DIAGNOSIS — D69.6 DECREASED PLATELET COUNT (HCC): Primary | ICD-10-CM

## 2018-12-04 DIAGNOSIS — Z23 NEED FOR INFLUENZA VACCINATION: ICD-10-CM

## 2018-12-04 PROCEDURE — 99203 OFFICE O/P NEW LOW 30 MIN: CPT | Performed by: NURSE PRACTITIONER

## 2018-12-04 PROCEDURE — 90471 IMMUNIZATION ADMIN: CPT | Performed by: NURSE PRACTITIONER

## 2018-12-04 PROCEDURE — 90686 IIV4 VACC NO PRSV 0.5 ML IM: CPT | Performed by: NURSE PRACTITIONER

## 2018-12-04 PROCEDURE — 3008F BODY MASS INDEX DOCD: CPT | Performed by: NURSE PRACTITIONER

## 2018-12-04 NOTE — PROGRESS NOTES
Chief Complaint   Patient presents with    New Patient     Establish a new PCP and to get the flu shot       Assessment/Plan:     Diagnoses and all orders for this visit:    Decreased platelet count ([address] )  -     CBC and differential; Future          Subjective:      Patient ID: Margy Dang is a 25 y o  female  Patient here to obtain new pcp and establish care  She last seen by hematology when she was 8 week post partum  Has not had any evaluation since then  She has no concerns about her health today  She is enjoying her baby girl and does not show concerns for post partum depression  The following portions of the patient's history were reviewed and updated as appropriate: allergies, current medications, past family history, past medical history, past social history, past surgical history and problem list     Review of Systems   Constitutional: Negative for chills, diaphoresis and fever  Respiratory: Negative for chest tightness and shortness of breath  Cardiovascular: Negative for chest pain and palpitations  Gastrointestinal: Negative for constipation  Endocrine: Negative for polydipsia, polyphagia and polyuria  Genitourinary: Positive for menstrual problem (irregular now has some OCP from OBGYN has not started yet  )  Hematological: Bruises/bleeds easily  Psychiatric/Behavioral: Negative for dysphoric mood  The patient is not nervous/anxious  Objective:      /68 (BP Location: Left arm, Patient Position: Sitting, Cuff Size: Large)   Pulse 64   Temp 97 9 °F (36 6 °C) (Temporal)   Resp 16   Ht 5' 3" (1 6 m)   Wt 64 4 kg (142 lb)   SpO2 98%   BMI 25 15 kg/m²          Physical Exam   Constitutional: She is oriented to person, place, and time  She appears well-developed and well-nourished  HENT:   Head: Normocephalic and atraumatic  Neck: No JVD present  No thyromegaly present     Cardiovascular: Normal rate, regular rhythm, S1 normal, S2 normal and normal heart sounds  No murmur heard  No lower extremity edema    Pulmonary/Chest: Effort normal and breath sounds normal    Abdominal: Soft  Bowel sounds are normal    Lymphadenopathy:     She has no cervical adenopathy  Neurological: She is alert and oriented to person, place, and time  Psychiatric: She has a normal mood and affect  Thought content normal  She does not exhibit a depressed mood  She expresses no homicidal and no suicidal ideation  She expresses no suicidal plans and no homicidal plans  Nursing note and vitals reviewed

## 2019-01-04 ENCOUNTER — APPOINTMENT (OUTPATIENT)
Dept: LAB | Facility: MEDICAL CENTER | Age: 29
End: 2019-01-04
Payer: COMMERCIAL

## 2019-01-04 DIAGNOSIS — D69.6 DECREASED PLATELET COUNT (HCC): ICD-10-CM

## 2019-01-04 LAB
BASOPHILS # BLD AUTO: 0.04 THOUSANDS/ΜL (ref 0–0.1)
BASOPHILS NFR BLD AUTO: 1 % (ref 0–1)
EOSINOPHIL # BLD AUTO: 0.07 THOUSAND/ΜL (ref 0–0.61)
EOSINOPHIL NFR BLD AUTO: 1 % (ref 0–6)
ERYTHROCYTE [DISTWIDTH] IN BLOOD BY AUTOMATED COUNT: 11.9 % (ref 11.6–15.1)
HCT VFR BLD AUTO: 38.2 % (ref 34.8–46.1)
HGB BLD-MCNC: 12.4 G/DL (ref 11.5–15.4)
IMM GRANULOCYTES # BLD AUTO: 0.01 THOUSAND/UL (ref 0–0.2)
IMM GRANULOCYTES NFR BLD AUTO: 0 % (ref 0–2)
LYMPHOCYTES # BLD AUTO: 2.96 THOUSANDS/ΜL (ref 0.6–4.47)
LYMPHOCYTES NFR BLD AUTO: 38 % (ref 14–44)
MCH RBC QN AUTO: 29 PG (ref 26.8–34.3)
MCHC RBC AUTO-ENTMCNC: 32.5 G/DL (ref 31.4–37.4)
MCV RBC AUTO: 90 FL (ref 82–98)
MONOCYTES # BLD AUTO: 0.85 THOUSAND/ΜL (ref 0.17–1.22)
MONOCYTES NFR BLD AUTO: 11 % (ref 4–12)
NEUTROPHILS # BLD AUTO: 3.79 THOUSANDS/ΜL (ref 1.85–7.62)
NEUTS SEG NFR BLD AUTO: 49 % (ref 43–75)
NRBC BLD AUTO-RTO: 0 /100 WBCS
PLATELET # BLD AUTO: 120 THOUSANDS/UL (ref 149–390)
PMV BLD AUTO: 10.6 FL (ref 8.9–12.7)
RBC # BLD AUTO: 4.27 MILLION/UL (ref 3.81–5.12)
WBC # BLD AUTO: 7.72 THOUSAND/UL (ref 4.31–10.16)

## 2019-01-04 PROCEDURE — 85025 COMPLETE CBC W/AUTO DIFF WBC: CPT

## 2019-01-04 PROCEDURE — 36415 COLL VENOUS BLD VENIPUNCTURE: CPT

## 2019-01-07 ENCOUNTER — TELEPHONE (OUTPATIENT)
Dept: FAMILY MEDICINE CLINIC | Facility: CLINIC | Age: 29
End: 2019-01-07

## 2019-01-07 NOTE — TELEPHONE ENCOUNTER
----- Message from [address] [place] [address] sent at 1/6/2019 11:23 PM EST -----  Platelet count coming up no further work up

## 2021-01-15 ENCOUNTER — TELEPHONE (OUTPATIENT)
Dept: FAMILY MEDICINE CLINIC | Facility: CLINIC | Age: 31
End: 2021-01-15

## 2021-01-15 NOTE — TELEPHONE ENCOUNTER
On 11/11/2019 patient established care with [facility], [address]   [address], [address]   [phone] (Fax)

## 2021-02-11 NOTE — TELEPHONE ENCOUNTER
02/10/21 10:54 PM     Thank you for your request  Your request has been received, reviewed, and the patient chart updated  The PCP has successfully been removed with a patient attribution note  This message will now be completed      Thank you  Lilo Bansal

## 2022-08-23 NOTE — OB LABOR/OXYTOCIN SAFETY PROGRESS
Labor Progress Note - [facility] 25 y o  female MRN: [id number]    Unit/Bed#: L&D 322-01 Encounter: [id number]    Obstetric History       T0      L0     SAB0   TAB0   Ectopic0   Multiple0   Live Births0      Gestational Age: 39w6d     Contraction Frequency (minutes): 2-3  Contraction Quality: Mild  Tachysystole: No   Dilation: 3        Effacement (%): 70  Station: -2  Baseline Rate: 135 bpm  Fetal Heart Rate: 135 BPM  FHR Category: Category I          Notes/comments:   Pt comfortable, declines anything for pain  Making small amounts of cervical change  FHT Cat I, cont to monitor  Will start pitocin titration now  Recheck in 1-2hr, sooner if uncomfortable    Discussed plan previously with Dr Machelle Ang DO 2018 3:32 AM Total Volume Injected (Ccs- Only Use Numbers And Decimals): 2